# Patient Record
Sex: FEMALE | Race: WHITE | NOT HISPANIC OR LATINO | Employment: FULL TIME | URBAN - METROPOLITAN AREA
[De-identification: names, ages, dates, MRNs, and addresses within clinical notes are randomized per-mention and may not be internally consistent; named-entity substitution may affect disease eponyms.]

---

## 2021-10-23 ENCOUNTER — OFFICE VISIT (OUTPATIENT)
Dept: URGENT CARE | Facility: CLINIC | Age: 61
End: 2021-10-23
Payer: COMMERCIAL

## 2021-10-23 VITALS
OXYGEN SATURATION: 100 % | TEMPERATURE: 98.9 F | SYSTOLIC BLOOD PRESSURE: 128 MMHG | BODY MASS INDEX: 24.4 KG/M2 | WEIGHT: 161 LBS | DIASTOLIC BLOOD PRESSURE: 80 MMHG | RESPIRATION RATE: 20 BRPM | HEART RATE: 68 BPM | HEIGHT: 68 IN

## 2021-10-23 DIAGNOSIS — N89.8 VAGINAL ITCHING: ICD-10-CM

## 2021-10-23 DIAGNOSIS — L50.9 URTICARIA: ICD-10-CM

## 2021-10-23 DIAGNOSIS — R21 RASH AND NONSPECIFIC SKIN ERUPTION: ICD-10-CM

## 2021-10-23 LAB
APPEARANCE UR: CLEAR
BILIRUB UR STRIP-MCNC: NEGATIVE MG/DL
COLOR UR AUTO: YELLOW
GLUCOSE UR STRIP.AUTO-MCNC: NEGATIVE MG/DL
KETONES UR STRIP.AUTO-MCNC: NEGATIVE MG/DL
LEUKOCYTE ESTERASE UR QL STRIP.AUTO: NEGATIVE
NITRITE UR QL STRIP.AUTO: NEGATIVE
PH UR STRIP.AUTO: 8 [PH] (ref 5–8)
PROT UR QL STRIP: NEGATIVE MG/DL
RBC UR QL AUTO: NEGATIVE
SP GR UR STRIP.AUTO: 1.01
UROBILINOGEN UR STRIP-MCNC: 0.2 MG/DL

## 2021-10-23 PROCEDURE — 81002 URINALYSIS NONAUTO W/O SCOPE: CPT | Performed by: NURSE PRACTITIONER

## 2021-10-23 PROCEDURE — 99203 OFFICE O/P NEW LOW 30 MIN: CPT | Mod: 25 | Performed by: NURSE PRACTITIONER

## 2021-10-23 RX ORDER — METHYLPREDNISOLONE SODIUM SUCCINATE 125 MG/2ML
125 INJECTION, POWDER, LYOPHILIZED, FOR SOLUTION INTRAMUSCULAR; INTRAVENOUS ONCE
Status: COMPLETED | OUTPATIENT
Start: 2021-10-23 | End: 2021-10-23

## 2021-10-23 RX ORDER — METHYLPREDNISOLONE 4 MG/1
TABLET ORAL
Qty: 21 TABLET | Refills: 0 | Status: SHIPPED | OUTPATIENT
Start: 2021-10-23 | End: 2021-11-02

## 2021-10-23 RX ADMIN — METHYLPREDNISOLONE SODIUM SUCCINATE 125 MG: 125 INJECTION, POWDER, LYOPHILIZED, FOR SOLUTION INTRAMUSCULAR; INTRAVENOUS at 18:56

## 2021-10-23 ASSESSMENT — ENCOUNTER SYMPTOMS
FEVER: 0
CHILLS: 0
MYALGIAS: 0
DIARRHEA: 0
NAUSEA: 0
HEADACHES: 0
ABDOMINAL PAIN: 0
COUGH: 0
SORE THROAT: 0
VOMITING: 0

## 2021-10-24 NOTE — PATIENT INSTRUCTIONS
Hives  Hives (urticaria) are itchy, red, swollen areas on the skin. Hives can appear on any part of the body. Hives often fade within 24 hours (acute hives). Sometimes, new hives appear after old ones fade and the cycle can continue for several days or weeks (chronic hives). Hives do not spread from person to person (are not contagious).  Hives come from the body's reaction to something a person is allergic to (allergen), something that causes irritation, or various other triggers. When a person is exposed to a trigger, his or her body releases a chemical (histamine) that causes redness, itching, and swelling. Hives can appear right after exposure to a trigger or hours later.  What are the causes?  This condition may be caused by:  · Allergies to foods or ingredients.  · Insect bites or stings.  · Exposure to pollen or pets.  · Contact with latex or chemicals.  · Spending time in sunlight, heat, or cold (exposure).  · Exercise.  · Stress.  · Certain medicines.  You can also get hives from other medical conditions and treatments, such as:  · Viruses, including the common cold.  · Bacterial infections, such as urinary tract infections and strep throat.  · Certain medicines.  · Allergy shots.  · Blood transfusions.  Sometimes, the cause of this condition is not known (idiopathic hives).  What increases the risk?  You are more likely to develop this condition if you:  · Are a woman.  · Have food allergies, especially to citrus fruits, milk, eggs, peanuts, tree nuts, or shellfish.  · Are allergic to:  ? Medicines.  ? Latex.  ? Insects.  ? Animals.  ? Pollen.  What are the signs or symptoms?  Common symptoms of this condition include raised, itchy, red or white bumps or patches on your skin. These areas may:  · Become large and swollen (welts).  · Change in shape and location, quickly and repeatedly.  · Be separate hives or connect over a large area of skin.  · Sting or become painful.  · Turn white when pressed in the  [place] ([place]  In severe cases, your hands, feet, and face may also become swollen. This may occur if hives develop deeper in your skin.  How is this diagnosed?  This condition may be diagnosed by your symptoms, medical history, and physical exam.  · Your skin, urine, or blood may be tested to find out what is causing your hives and to rule out other health issues.  · Your health care provider may also remove a small sample of skin from the affected area and examine it under a microscope (biopsy).  How is this treated?  Treatment for this condition depends on the cause and severity of your symptoms. Your health care provider may recommend using cool, wet cloths (cool compresses) or taking cool showers to relieve itching. Treatment may include:  · Medicines that help:  ? Relieve itching (antihistamines).  ? Reduce swelling (corticosteroids).  ? Treat infection (antibiotics).  · An injectable medicine (omalizumab). Your health care provider may prescribe this if you have chronic idiopathic hives and you continue to have symptoms even after treatment with antihistamines.  Severe cases may require an emergency injection of adrenaline (epinephrine) to prevent a life-threatening allergic reaction (anaphylaxis).  Follow these instructions at home:  Medicines  · Take and apply over-the-counter and prescription medicines only as told by your health care provider.  · If you were prescribed an antibiotic medicine, take it as told by your health care provider. Do not stop using the antibiotic even if you start to feel better.  Skin care  · Apply cool compresses to the affected areas.  · Do not scratch or rub your skin.  General instructions  · Do not take hot showers or baths. This can make itching worse.  · Do not wear tight-fitting clothing.  · Use sunscreen and wear protective clothing when you are outside.  · Avoid any substances that cause your hives. Keep a journal to help track what causes your hives. Write  down:  ? What medicines you take.  ? What you eat and drink.  ? What products you use on your skin.  · Keep all follow-up visits as told by your health care provider. This is important.  Contact a health care provider if:  · Your symptoms are not controlled with medicine.  · Your joints are painful or swollen.  Get help right away if:  · You have a fever.  · You have pain in your abdomen.  · Your tongue or lips are swollen.  · Your eyelids are swollen.  · Your chest or throat feels tight.  · You have trouble breathing or swallowing.  These symptoms may represent a serious problem that is an emergency. Do not wait to see if the symptoms will go away. Get medical help right away. Call your local emergency services (911 in the [place] Do not drive yourself to the hospital.  Summary  · Hives (urticaria) are itchy, red, swollen areas on your skin. Hives come from the body's reaction to something a person is allergic to (allergen), something that causes irritation, or various other triggers.  · Treatment for this condition depends on the cause and severity of your symptoms.  · Avoid any substances that cause your hives. Keep a journal to help track what causes your hives.  · Take and apply over-the-counter and prescription medicines only as told by your health care provider.  · Keep all follow-up visits as told by your health care provider. This is important.  This information is not intended to replace advice given to you by your health care provider. Make sure you discuss any questions you have with your health care provider.  Document Released: 12/18/2006 Document Revised: 07/03/2019 Document Reviewed: 07/03/2019  Elsevier Patient Education © 2020 [employer]  Angioedema  Angioedema is the sudden swelling of tissue in the body. Angioedema can affect any part of the body, but it most often affects the deeper parts of the skin, causing red, itchy patches (hives) to appear over the affected area. It often begins during  the night and is found in the morning.  Depending on the cause, angioedema may happen:  · Only once.  · Several times. It may come back in unpredictable patterns.  · Repeatedly for several years. Over time, it may gradually stop coming back.  Angioedema can be life-threatening if it affects the air passages that you breathe through.  What are the causes?  This condition may be caused by:  · Foods, such as milk, eggs, shellfish, wheat, or nuts.  · Certain medicines, such as ACE inhibitors, antibiotics, nonsteroidal anti-inflammatory drugs, birth control pills, or dyes used in X-rays.  · Insect stings.  · Infections.  Angioedema can be inherited, and episodes can be triggered by:  · Mild injury.  · Dental work.  · Surgery.  · Stress.  · Sudden changes in temperature.  · Exercise.  In some cases, the cause of this condition is not known.  What are the signs or symptoms?  Symptoms of this condition depend on where the swelling happens. Symptoms may include:  · Swollen skin.  · Red, itchy patches of skin (hives).  · Redness in the affected area.  · Pain in the affected area.  · Swollen lips or tongue.  · Wheezing.  · Breathing problems.    If your internal organs are involved, symptoms may also include:  · Nausea.  · Abdominal pain.  · Vomiting.  · Difficulty swallowing.  · Difficulty passing urine.  How is this diagnosed?  This condition may be diagnosed based on:  · An exam of the affected area.  · Your medical history.  · Whether anyone in your family has had this condition before.  · A review of any medicines you have been taking.  · Tests, including:  ? Allergy skin tests to see if the condition was caused by an allergic reaction.  ? Blood tests to see if the condition was caused by a gene.  ? Tests to check for underlying diseases that could cause the condition.  How is this treated?  Treatment for this condition depends on the cause. It may involve any of the following:  · If something triggered the condition,  making changes to keep it from triggering the condition again.  · If the condition affects your breathing, having tubes placed in your airway to keep it open.  · Taking medicines to treat symptoms or prevent future episodes. These may include:  ? Antihistamines.  ? Epinephrine injections.  ? Steroids.  If your condition is severe, you may need to be treated at the hospital. Angioedema usually gets better in 24-48 hours.  Follow these instructions at home:  · Take over-the-counter and prescription medicines only as told by your health care provider.  · If you were given medicines for emergency allergy treatment, always carry them with you.  · Wear a medical bracelet as told by your health care provider.  · If something triggers your condition, avoid the trigger, if possible.  · If your condition is inherited and you are thinking about having children, talk to your health care provider. It is important to discuss the risks of passing on the condition to your children.  Contact a health care provider if:  · You have repeated episodes of angioedema.  · Episodes of angioedema start to happen more often than they used to, even after you take steps to prevent them.  · You have episodes of angioedema that are more severe than they have been before, even after you take steps to prevent them.  · You are thinking about having children.  Get help right away if:  · You have severe swelling of your mouth, tongue, or lips.  · You have trouble breathing.  · You have trouble swallowing.  · You faint.  This information is not intended to replace advice given to you by your health care provider. Make sure you discuss any questions you have with your health care provider.  Document Released: 02/26/2003 Document Revised: 11/30/2018 Document Reviewed: 06/27/2017  Elsevier Patient Education © 2020 [employer]

## 2021-10-24 NOTE — PROGRESS NOTES
Subjective:     Amelia Thakkar is a 61 y.o. female who presents for Rash (Rash all over body, UTI x 2 weeks)      Rash  Pertinent negatives include no congestion, cough, diarrhea, fever, sore throat or vomiting.     Pt presents for evaluation of a new problem.  Amelia is a pleasant 61-year-old female who presents to urgent care today for complaints of a rash over her entire body that started 3 days ago.  Her rash is progressively worsening.  She notes that her skin has been sensitive since 5 months ago.  She notes severe dryness and itching of her scalp.  She has been using dandruff shampoo and hydrating lotion.  3 days ago she developed a itching rash over her shoulders that has progressively spread all the way down to her legs and up her face.  She has been using over-the-counter Benadryl and hydrocortisone cream.  She notes that this is kept this symptoms at bay, but has not improved her symptoms.  She denies any sore throat, difficulty swallowing, difficulty breathing, nausea/vomiting or diarrhea.  She also complains of vaginal itching and is concerned she may have a urinary tract infection.  She denies any new soap, lotion, laundry detergent, dryer sheets, food or medication.    Review of Systems   Constitutional: Negative for chills, fever and malaise/fatigue.   HENT: Negative for congestion and sore throat.    Respiratory: Negative for cough.    Gastrointestinal: Negative for abdominal pain, diarrhea, nausea and vomiting.   Musculoskeletal: Negative for myalgias.   Skin: Positive for itching and rash.   Neurological: Negative for headaches.       PMH: History reviewed. No pertinent past medical history.  ALLERGIES: Not on File  SURGHX: History reviewed. No pertinent surgical history.  SOCHX:   Social History     Socioeconomic History   • Marital status: Single     Spouse name: Not on file   • Number of children: Not on file   • Years of education: Not on file   • Highest education level: Not on file  "  Occupational History   • Not on file   Tobacco Use   • Smoking status: Not on file   Substance and Sexual Activity   • Alcohol use: Not on file   • Drug use: Not on file   • Sexual activity: Not on file   Other Topics Concern   • Not on file   Social History Narrative   • Not on file     Social Determinants of Health     Financial Resource Strain:    • Difficulty of Paying Living Expenses:    Food Insecurity:    • Worried About Running Out of Food in the Last Year:    • Ran Out of Food in the Last Year:    Transportation Needs:    • Lack of Transportation (Medical):    • Lack of Transportation (Non-Medical):    Physical Activity:    • Days of Exercise per Week:    • Minutes of Exercise per Session:    Stress:    • Feeling of Stress :    Social Connections:    • Frequency of Communication with Friends and Family:    • Frequency of Social Gatherings with Friends and Family:    • Attends [employer] Services:    • Active Member of Clubs or Organizations:    • Attends Club or Organization Meetings:    • Marital Status:    Intimate Partner Violence:    • Fear of Current or Ex-Partner:    • Emotionally Abused:    • Physically Abused:    • Sexually Abused:      FH: History reviewed. No pertinent family history.      Objective:   /80 (BP Location: Left arm, Patient Position: Sitting, BP Cuff Size: Adult long)   Pulse 68   Temp 37.2 °C (98.9 °F) (Temporal)   Resp 20   Ht 1.727 m (5' 8\")   Wt 73 kg (161 lb)   SpO2 100%   BMI 24.48 kg/m²     Physical Exam  Vitals and nursing note reviewed.   Constitutional:       General: She is not in acute distress.     Appearance: Normal appearance. She is not ill-appearing.   HENT:      Head: Normocephalic and atraumatic.      Right Ear: External ear normal.      Left Ear: External ear normal.      Nose: No congestion or rhinorrhea.      Mouth/Throat:      Mouth: Mucous membranes are moist.   Eyes:      Extraocular Movements: Extraocular movements intact.      Pupils: Pupils " are equal, round, and reactive to light.   Cardiovascular:      Rate and Rhythm: Normal rate and regular rhythm.      Pulses: Normal pulses.      Heart sounds: Normal heart sounds.   Pulmonary:      Effort: Pulmonary effort is normal.      Breath sounds: Normal breath sounds.   Abdominal:      General: Abdomen is flat. Bowel sounds are normal.      Palpations: Abdomen is soft.      Tenderness: There is no abdominal tenderness. There is no right CVA tenderness or left CVA tenderness.   Musculoskeletal:         General: Normal range of motion.      Cervical back: Normal range of motion and neck supple.   Skin:     General: Skin is warm and dry.      Capillary Refill: Capillary refill takes less than 2 seconds.      Findings: Erythema and rash present. Rash is macular, papular and urticarial.      Comments: positive for large urticarial wheals over neck, back, torso, arms, hands, legs and face.   Neurological:      General: No focal deficit present.      Mental Status: She is alert and oriented to person, place, and time. Mental status is at baseline.   Psychiatric:         Mood and Affect: Mood normal.         Behavior: Behavior normal.         Thought Content: Thought content normal.         Judgment: Judgment normal.         Assessment/Plan:   Assessment    1. Vaginal itching  POCT Urinalysis   2. Rash and nonspecific skin eruption  REFERRAL TO ALLERGY    methylPREDNISolone sod succ (SOLU-MEDROL) 125 MG injection 125 mg    methylPREDNISolone (MEDROL DOSEPAK) 4 MG Tablet Therapy Pack   3. Urticaria  REFERRAL TO ALLERGY    methylPREDNISolone sod succ (SOLU-MEDROL) 125 MG injection 125 mg    methylPREDNISolone (MEDROL DOSEPAK) 4 MG Tablet Therapy Pack      Encouraged her to use over-the-counter Benadryl, Zyrtec in addition to steroid.  Steroid injection given in clinic today.  She tolerated this well.  Medrol Dosepak was also sent to the pharmacy for her to start in 2 days if symptoms do not improve.  She verbalized  understanding.  Side effects of steroids discussed.  We discussed differential diagnoses for itching.  I also recommended she refrain from any laundry detergent scent.  She was referred to follow up with allergy for further treatment and evaluation.  AVS handout given and reviewed with patient. Pt educated on red flags and when to seek treatment back in ER or UC.

## 2021-11-02 ENCOUNTER — OFFICE VISIT (OUTPATIENT)
Dept: MEDICAL GROUP | Facility: PHYSICIAN GROUP | Age: 61
End: 2021-11-02
Payer: COMMERCIAL

## 2021-11-02 VITALS
WEIGHT: 158 LBS | HEART RATE: 81 BPM | OXYGEN SATURATION: 98 % | DIASTOLIC BLOOD PRESSURE: 76 MMHG | RESPIRATION RATE: 12 BRPM | TEMPERATURE: 98.8 F | SYSTOLIC BLOOD PRESSURE: 108 MMHG | HEIGHT: 68 IN | BODY MASS INDEX: 23.95 KG/M2

## 2021-11-02 DIAGNOSIS — H01.00B BLEPHARITIS OF UPPER AND LOWER EYELIDS OF BOTH EYES, UNSPECIFIED TYPE: ICD-10-CM

## 2021-11-02 DIAGNOSIS — Z83.42 FAMILY HISTORY OF HIGH CHOLESTEROL: ICD-10-CM

## 2021-11-02 DIAGNOSIS — H01.00A BLEPHARITIS OF UPPER AND LOWER EYELIDS OF BOTH EYES, UNSPECIFIED TYPE: ICD-10-CM

## 2021-11-02 DIAGNOSIS — Z11.59 NEED FOR HEPATITIS C SCREENING TEST: ICD-10-CM

## 2021-11-02 DIAGNOSIS — L50.9 URTICARIA: ICD-10-CM

## 2021-11-02 DIAGNOSIS — Z12.31 ENCOUNTER FOR SCREENING MAMMOGRAM FOR BREAST CANCER: ICD-10-CM

## 2021-11-02 DIAGNOSIS — Z98.890 S/P CORRECTION OF DEVIATED NASAL SEPTUM: ICD-10-CM

## 2021-11-02 DIAGNOSIS — Z83.3 FAMILY HISTORY OF DIABETES MELLITUS (DM): ICD-10-CM

## 2021-11-02 DIAGNOSIS — R21 RASH AND NONSPECIFIC SKIN ERUPTION: ICD-10-CM

## 2021-11-02 DIAGNOSIS — Z98.890 HISTORY OF LUMPECTOMY OF RIGHT BREAST: ICD-10-CM

## 2021-11-02 DIAGNOSIS — Z76.89 ENCOUNTER TO ESTABLISH CARE: ICD-10-CM

## 2021-11-02 DIAGNOSIS — J45.20 MILD INTERMITTENT ASTHMA WITHOUT COMPLICATION: ICD-10-CM

## 2021-11-02 PROBLEM — J34.2 DEVIATED SEPTUM: Status: ACTIVE | Noted: 2021-06-16

## 2021-11-02 PROCEDURE — 99214 OFFICE O/P EST MOD 30 MIN: CPT | Performed by: NURSE PRACTITIONER

## 2021-11-02 RX ORDER — ERYTHROMYCIN 5 MG/G
1 OINTMENT OPHTHALMIC 4 TIMES DAILY
Qty: 3.5 G | Refills: 1 | Status: SHIPPED | OUTPATIENT
Start: 2021-11-02 | End: 2021-11-02

## 2021-11-02 RX ORDER — HYDROCODONE BITARTRATE AND ACETAMINOPHEN 5; 325 MG/1; MG/1
TABLET ORAL
COMMUNITY
Start: 2021-08-18 | End: 2021-11-02

## 2021-11-02 RX ORDER — SODIUM CHLORIDE 0.65 %
AEROSOL, SPRAY (ML) NASAL
COMMUNITY
Start: 2021-08-18 | End: 2021-11-30

## 2021-11-02 RX ORDER — ERYTHROMYCIN 5 MG/G
1 OINTMENT OPHTHALMIC 2 TIMES DAILY
Qty: 3.5 G | Refills: 0 | Status: SHIPPED | OUTPATIENT
Start: 2021-11-02 | End: 2021-11-30

## 2021-11-02 RX ORDER — OXYMETAZOLINE HCL 0.05 G/100ML
SPRAY NASAL
COMMUNITY
Start: 2021-08-18 | End: 2021-11-30

## 2021-11-02 RX ORDER — OXYMETAZOLINE HYDROCHLORIDE 0.05 G/100ML
SPRAY NASAL
COMMUNITY
Start: 2021-08-18 | End: 2021-11-30

## 2021-11-02 RX ORDER — CEPHALEXIN 500 MG/1
CAPSULE ORAL
COMMUNITY
Start: 2021-08-18 | End: 2021-11-02

## 2021-11-02 RX ORDER — AZELASTINE 1 MG/ML
1 SPRAY, METERED NASAL
COMMUNITY
Start: 2021-09-20 | End: 2021-11-30

## 2021-11-02 RX ORDER — ALBUTEROL SULFATE 90 UG/1
AEROSOL, METERED RESPIRATORY (INHALATION)
COMMUNITY
Start: 2021-08-24 | End: 2022-09-06 | Stop reason: SDUPTHER

## 2021-11-02 RX ORDER — ECHINACEA PURPUREA EXTRACT 125 MG
1 TABLET ORAL
COMMUNITY
Start: 2021-08-18 | End: 2021-11-30

## 2021-11-02 RX ORDER — TRIAMCINOLONE ACETONIDE 1 MG/G
1 CREAM TOPICAL 2 TIMES DAILY
Qty: 28.4 G | Refills: 2 | Status: SHIPPED | OUTPATIENT
Start: 2021-11-02 | End: 2021-11-30

## 2021-11-02 ASSESSMENT — PATIENT HEALTH QUESTIONNAIRE - PHQ9: CLINICAL INTERPRETATION OF PHQ2 SCORE: 0

## 2021-11-02 NOTE — ASSESSMENT & PLAN NOTE
This is a historical condition, stable.  She has had nasal septoplasty and bilateral IT cauterizatin/reduction done 8/2021.  She denies any concerns after surgery.

## 2021-11-02 NOTE — ASSESSMENT & PLAN NOTE
This is a chronic condition, stable.  She reports chemical exposure at work caused asthma.  She reports no recent exacerbation.  She does have Wixela, which she uses one puff daily; and albuterol inhaler PRN - reports rarely uses.  She denies any respiratory symptoms today.   > Continue Wixela 100-50 mcg one puff daily.  Continue albuterol inhaler as needed.  She does have a scheduled appointment with allergist later this week.

## 2021-11-02 NOTE — PROGRESS NOTES
Subjective  Chief Complaint  Establish care to manage her chronic conditions    History of Present Illness  Amelia Thakkar is a 61 y.o. female. This patient is here today to establish care.    Patient Active Problem List    Diagnosis Date Noted   • S/P correction of deviated nasal septum 06/16/2021   • Malignant neoplasm of skin 11/07/2012   • Asthma 05/22/2012     Past Medical History    Allergies: Other drug, Phenazopyridine, Phenazopyridine hcl, and Azo-gantrisin  History reviewed. No pertinent past medical history.  Past Surgical History:   Procedure Laterality Date   • NASAL SEPTAL RECONSTRUCTION  08/2021   • LUMPECTOMY Right 1988     Current Outpatient Medications Ordered in Epic   Medication Sig Dispense Refill   • albuterol 108 (90 Base) MCG/ACT Aero Soln inhalation aerosol      • azelastine (ASTELIN) 137 MCG/SPRAY nasal spray Administer 1 Spray into affected nostril(S).     • WIXELA INHUB 100-50 MCG/DOSE AEROSOL POWDER, BREATH ACTIVATED      • Oxymetazoline HCl (NASAL SPRAY) 0.05 % Solution USE 2 SPRAYS IN EACH NOSTRIL 2 TIMES DAILY. DO NOT USE FOR MORE THAN 3 CONSECUTIVE DAYS.     • GNP NASAL SPRAY EXTRA MOIST 0.05 % Solution      • sodium chloride (OCEAN) 0.65 % Solution Administer 1 Spray into affected nostril(S).     • DEEP SEA NASAL SPRAY 0.65 % Solution      • triamcinolone acetonide (KENALOG) 0.1 % Cream Apply 1 g topically 2 times a day. 28.4 g 2   • erythromycin 5 MG/GM Ointment Apply 1 Application to both eyes 2 times a day. For 7 days 3.5 g 0     No current Epic-ordered facility-administered medications on file.     Family History:    Family History   Problem Relation Age of Onset   • Diabetes Mother    • Diabetes Father    • Hypertension Father    • Hyperlipidemia Father    • Cancer Paternal Aunt         breast   • Hypertension Paternal Aunt    • Hypertension Paternal Uncle    • Cancer Maternal Grandfather         skin   • Cancer Other         uterine - cousin paternal   • Heart Disease Neg Hx   "  • Stroke Neg Hx       Personal/Social History:    Social History     Tobacco Use   • Smoking status: Never Smoker   • Smokeless tobacco: Never Used   Vaping Use   • Vaping Use: Never used   Substance Use Topics   • Alcohol use: Yes     Comment: occ   • Drug use: Not Currently     Social History     Social History Narrative   • Not on file      Current Outpatient Medications   Medication Sig Dispense Refill   • albuterol 108 (90 Base) MCG/ACT Aero Soln inhalation aerosol      • azelastine (ASTELIN) 137 MCG/SPRAY nasal spray Administer 1 Spray into affected nostril(S).     • WIXELA INHUB 100-50 MCG/DOSE AEROSOL POWDER, BREATH ACTIVATED      • Oxymetazoline HCl (NASAL SPRAY) 0.05 % Solution USE 2 SPRAYS IN EACH NOSTRIL 2 TIMES DAILY. DO NOT USE FOR MORE THAN 3 CONSECUTIVE DAYS.     • GNP NASAL SPRAY EXTRA MOIST 0.05 % Solution      • sodium chloride (OCEAN) 0.65 % Solution Administer 1 Spray into affected nostril(S).     • DEEP SEA NASAL SPRAY 0.65 % Solution      • triamcinolone acetonide (KENALOG) 0.1 % Cream Apply 1 g topically 2 times a day. 28.4 g 2   • erythromycin 5 MG/GM Ointment Apply 1 Application to both eyes 2 times a day. For 7 days 3.5 g 0     No current facility-administered medications for this visit.     Review of Systems  General: Negative for fever/chills.   Eyes:  Negative for vision changes.  ENT:  Negative for congestion.   Respiratory:  Negative for cough and dyspnea.    Cardiovascular:  Negative for chest pain and palpitations.  Gastrointestinal:  Negative for abdominal pain.   Genitourinary:  Negative for dysuria.   Musculoskeletal:  Negative for myalgias.   Skin:  See below.  Neurological:  Negative for numbness/tingling.   Heme/Lymph:  Does not bruise/bleed easily.    Objective  Physical Exam  /76 (BP Location: Right arm, Patient Position: Sitting, BP Cuff Size: Adult)   Pulse 81   Temp 37.1 °C (98.8 °F) (Temporal)   Resp 12   Ht 1.727 m (5' 8\")   Wt 71.7 kg (158 lb)   SpO2 98%  " Body mass index is 24.02 kg/m².  General:  Alert and oriented.  Well appearing.  NAD.  Head:  Normocephalic.   Eyes:  Eyes conjunctiva clear lids without ptosis; bilateral eyelids with pruritis, redness with crusting.  ENT: Ears normal shape and contour.  Pulmonary:  Normal effort.  Clear to ausculation without rales, ronchi, or wheezing.  Cardiovascular:  Regular rate and rhythm without murmur, rubs or gallop.  Radial pulses are intact and equal bilaterally.  Gastrointestinal: Abdomen soft, nontender, nondistended. Normal bowel sounds. Liver and spleen are not palpable.  Musculoskeletal:  No extremity cyanosis, clubbing, or edema.  Skin:  Warm and dry.  Erythematous, pruritic.  Macular, papular, urticarial rash throughout back, neck, bilateral arms, and legs.    Neurologic: Grossly intact.  Sensation intact.   Psych: Normal mood and affect. Alert and oriented x3. Judgment and insight is normal.    Assessment/Plan   61 y.o. female presenting with the following.     1. Encounter to establish care     2. Mild intermittent asthma without complication     3. S/P correction of deviated nasal septum     4. Rash and nonspecific skin eruption  triamcinolone acetonide (KENALOG) 0.1 % Cream   5. Urticaria     6. Blepharitis of upper and lower eyelids of both eyes, unspecified type  erythromycin 5 MG/GM Ointment    DISCONTINUED: erythromycin 5 MG/GM Ointment   7. Encounter for screening mammogram for breast cancer  MA-SCREENING MAMMO BILAT W/TOMOSYNTHESIS W/CAD   8. History of lumpectomy of right breast  MA-SCREENING MAMMO BILAT W/TOMOSYNTHESIS W/CAD   9. Need for hepatitis C screening test  HCV Scrn ( [id number] [employer])   10. Family history of diabetes mellitus (DM)  Comp Metabolic Panel    HEMOGLOBIN A1C    CBC WITH DIFFERENTIAL   11. Family history of high cholesterol  Comp Metabolic Panel    Lipid Profile     Asthma  This is a chronic condition, stable.  She reports chemical exposure at work caused asthma.  She reports  no recent exacerbation.  She does have Wixela, which she uses one puff daily; and albuterol inhaler PRN - reports rarely uses.  She denies any respiratory symptoms today.   > Continue Wixela 100-50 mcg one puff daily.  Continue albuterol inhaler as needed.  She does have a scheduled appointment with allergist later this week.    S/P correction of deviated nasal septum  This is a historical condition, stable.  She has had nasal septoplasty and bilateral IT cauterizatin/reduction done 8/2021.  She denies any concerns after surgery.       Rash and nonspecific skin eruption  Urticaria  Blepharitis of upper and lower eyelids of both eyes, unspecified type  These are acute conditions.  Last seen in urgent care 10/23/2021.  At urgent care, was given IM methylprednisolone injection and medrol dose pack.  She reports completion of medrol dose pack with minimal relief from pruritic rash throughout her body.  She does have an upcoming appointment with allergist this week.  She denies any new medications, soaps, lotions, laundry detergent, or food.  She denies any respiratory symptoms, including sore throat, difficulty swallowing, difficulty breathing, shortness of breath.  She reports mild improvement of rash.   > For blepharitis of bilateral eyelids, erythromycin application to upper and lower eyelids BID for 7 days.  For rash/urticaria, kenalog cream application BID to affected areas.  Advised to continue avoidance of new medications, soaps, lotions, detergent or food.  Discussed will hold off on vaccines at this point until rash improvement.  Advised to keep appointment with allergist.     Return in about 4 weeks (around 11/30/2021), or if symptoms worsen or fail to improve, for hives/labs.    Health Maintenance: Completed > return back to clinic in 4 weeks for follow up and vaccinations.    I have placed the below orders and discussed them with an approved delegating provider.  The MA is performing the below orders under  the direction of Dr. Leone.    Please note that this dictation was created using voice recognition software. I have worked with consultants from the vendor as well as technical experts from [facility] to optimize the interface. I have made every reasonable attempt to correct obvious errors, but I expect that there are errors of grammar and possibly content that I did not discover before finalizing the note.    JOSE Chairez  [facility]

## 2021-11-05 ENCOUNTER — HOSPITAL ENCOUNTER (OUTPATIENT)
Dept: LAB | Facility: MEDICAL CENTER | Age: 61
End: 2021-11-05
Attending: NURSE PRACTITIONER
Payer: COMMERCIAL

## 2021-11-05 LAB
BASOPHILS # BLD AUTO: 0.2 % (ref 0–1.8)
BASOPHILS # BLD: 0.02 K/UL (ref 0–0.12)
CRP SERPL HS-MCNC: <0.3 MG/DL (ref 0–0.75)
EOSINOPHIL # BLD AUTO: 0.01 K/UL (ref 0–0.51)
EOSINOPHIL NFR BLD: 0.1 % (ref 0–6.9)
ERYTHROCYTE [DISTWIDTH] IN BLOOD BY AUTOMATED COUNT: 45.5 FL (ref 35.9–50)
HCT VFR BLD AUTO: 44.6 % (ref 37–47)
HGB BLD-MCNC: 15 G/DL (ref 12–16)
IMM GRANULOCYTES # BLD AUTO: 0.07 K/UL (ref 0–0.11)
IMM GRANULOCYTES NFR BLD AUTO: 0.7 % (ref 0–0.9)
LYMPHOCYTES # BLD AUTO: 1.13 K/UL (ref 1–4.8)
LYMPHOCYTES NFR BLD: 10.9 % (ref 22–41)
MCH RBC QN AUTO: 32.5 PG (ref 27–33)
MCHC RBC AUTO-ENTMCNC: 33.6 G/DL (ref 33.6–35)
MCV RBC AUTO: 96.7 FL (ref 81.4–97.8)
MONOCYTES # BLD AUTO: 0.45 K/UL (ref 0–0.85)
MONOCYTES NFR BLD AUTO: 4.3 % (ref 0–13.4)
NEUTROPHILS # BLD AUTO: 8.73 K/UL (ref 2–7.15)
NEUTROPHILS NFR BLD: 83.8 % (ref 44–72)
NRBC # BLD AUTO: 0 K/UL
NRBC BLD-RTO: 0 /100 WBC
PLATELET # BLD AUTO: 312 K/UL (ref 164–446)
PMV BLD AUTO: 11.2 FL (ref 9–12.9)
RBC # BLD AUTO: 4.61 M/UL (ref 4.2–5.4)
WBC # BLD AUTO: 10.4 K/UL (ref 4.8–10.8)

## 2021-11-05 PROCEDURE — 83520 IMMUNOASSAY QUANT NOS NONAB: CPT

## 2021-11-05 PROCEDURE — 36415 COLL VENOUS BLD VENIPUNCTURE: CPT

## 2021-11-05 PROCEDURE — 82785 ASSAY OF IGE: CPT

## 2021-11-05 PROCEDURE — 86003 ALLG SPEC IGE CRUDE XTRC EA: CPT | Mod: 91

## 2021-11-05 PROCEDURE — 85025 COMPLETE CBC W/AUTO DIFF WBC: CPT

## 2021-11-05 PROCEDURE — 86140 C-REACTIVE PROTEIN: CPT

## 2021-11-07 LAB — TRYPTASE SERPL-MCNC: 4.5 UG/L

## 2021-11-09 LAB
A ALTERNATA IGE QN: <0.1 KU/L
A FUMIGATUS IGE QN: <0.1 KU/L
ALMOND IGE QN: <0.1 KU/L
AVOCADO IGE QN: <0.1 KU/L
BANANA IGE QN: 0.16 KU/L
BERMUDA GRASS IGE QN: <0.1 KU/L
BOXELDER IGE QN: <0.1 KU/L
C SPHAEROSPERMUM IGE QN: <0.1 KU/L
CAT DANDER IGE QN: <0.1 KU/L
CELERY IGE QN: <0.1 KU/L
CHESTNUT IGE QN: <0.1 KU/L
CMN PIGWEED IGE QN: <0.1 KU/L
COCONUT IGE QN: <0.1 KU/L
COMMON RAGWEED IGE QN: <0.1 KU/L
COTTONWOOD IGE QN: <0.1 KU/L
COW MILK IGE QN: <0.1 KU/L
D FARINAE IGE QN: 0.26 KU/L
D PTERONYSS IGE QN: 0.19 KU/L
DEPRECATED MISC ALLERGEN IGE RAST QL: NORMAL
DOG DANDER IGE QN: <0.1 KU/L
EGG WHITE IGE QN: <0.1 KU/L
GRAPE IGE QN: <0.1 KU/L
IGE SERPL-ACNC: 164 KU/L
KIWIFRUIT IGE QN: <0.1 KU/L
M RACEMOSUS IGE QN: <0.1 KU/L
MOUSE EPITH IGE QN: <0.1 KU/L
MT JUNIPER IGE QN: <0.1 KU/L
MUGWORT IGE QN: <0.1 KU/L
OAT IGE QN: <0.1 KU/L
OLIVE POLN IGE QN: <0.1 KU/L
P NOTATUM IGE QN: <0.1 KU/L
PAPAYA IGE QN: <0.1 KU/L
PEANUT IGE QN: <0.1 KU/L
PECAN/HICK NUT IGE QN: <0.1 KU/L
POTATO IGE QN: <0.1 KU/L
ROACH IGE QN: <0.1 KU/L
SALTWORT IGE QN: <0.1 KU/L
SESAME SEED IGE QN: <0.1 KU/L
SOYBEAN IGE QN: <0.1 KU/L
TIMOTHY IGE QN: <0.1 KU/L
TOMATO IGE QN: <0.1 KU/L
WATERMELON IGE QN: <0.1 KU/L
WHEAT IGE QN: <0.1 KU/L
WHITE ELM IGE QN: <0.1 KU/L
WHITE MULBERRY IGE QN: <0.1 KU/L
WHITE OAK IGE QN: <0.1 KU/L
WORMWOOD IGE QN: <0.1 KU/L

## 2021-11-15 ENCOUNTER — HOSPITAL ENCOUNTER (OUTPATIENT)
Dept: LAB | Facility: MEDICAL CENTER | Age: 61
End: 2021-11-15
Attending: PHYSICIAN ASSISTANT
Payer: COMMERCIAL

## 2021-11-15 LAB
ALBUMIN SERPL BCP-MCNC: 4.3 G/DL (ref 3.2–4.9)
ALBUMIN/GLOB SERPL: 1.7 G/DL
ALP SERPL-CCNC: 76 U/L (ref 30–99)
ALT SERPL-CCNC: 12 U/L (ref 2–50)
ANION GAP SERPL CALC-SCNC: 10 MMOL/L (ref 7–16)
AST SERPL-CCNC: 13 U/L (ref 12–45)
BILIRUB SERPL-MCNC: 0.4 MG/DL (ref 0.1–1.5)
BUN SERPL-MCNC: 19 MG/DL (ref 8–22)
CALCIUM SERPL-MCNC: 9.7 MG/DL (ref 8.5–10.5)
CHLORIDE SERPL-SCNC: 104 MMOL/L (ref 96–112)
CO2 SERPL-SCNC: 25 MMOL/L (ref 20–33)
CREAT SERPL-MCNC: 0.93 MG/DL (ref 0.5–1.4)
GLOBULIN SER CALC-MCNC: 2.6 G/DL (ref 1.9–3.5)
GLUCOSE SERPL-MCNC: 93 MG/DL (ref 65–99)
POTASSIUM SERPL-SCNC: 4.1 MMOL/L (ref 3.6–5.5)
PROT SERPL-MCNC: 6.9 G/DL (ref 6–8.2)
SODIUM SERPL-SCNC: 139 MMOL/L (ref 135–145)

## 2021-11-15 PROCEDURE — 36415 COLL VENOUS BLD VENIPUNCTURE: CPT

## 2021-11-15 PROCEDURE — 86480 TB TEST CELL IMMUN MEASURE: CPT

## 2021-11-15 PROCEDURE — 80053 COMPREHEN METABOLIC PANEL: CPT

## 2021-11-16 LAB
GAMMA INTERFERON BACKGROUND BLD IA-ACNC: 0.02 IU/ML
M TB IFN-G BLD-IMP: NEGATIVE
M TB IFN-G CD4+ BCKGRND COR BLD-ACNC: -0.01 IU/ML
MITOGEN IGNF BCKGRD COR BLD-ACNC: 5.71 IU/ML
QFT TB2 - NIL TBQ2: -0.01 IU/ML

## 2021-11-30 ENCOUNTER — OFFICE VISIT (OUTPATIENT)
Dept: MEDICAL GROUP | Facility: PHYSICIAN GROUP | Age: 61
End: 2021-11-30
Payer: COMMERCIAL

## 2021-11-30 VITALS
TEMPERATURE: 98.7 F | RESPIRATION RATE: 12 BRPM | WEIGHT: 166.8 LBS | HEIGHT: 68 IN | OXYGEN SATURATION: 97 % | BODY MASS INDEX: 25.28 KG/M2 | DIASTOLIC BLOOD PRESSURE: 86 MMHG | HEART RATE: 75 BPM | SYSTOLIC BLOOD PRESSURE: 112 MMHG

## 2021-11-30 DIAGNOSIS — Z12.11 COLON CANCER SCREENING: ICD-10-CM

## 2021-11-30 DIAGNOSIS — L44.0 PRP (PITYRIASIS RUBRA PILARIS): ICD-10-CM

## 2021-11-30 PROCEDURE — 99213 OFFICE O/P EST LOW 20 MIN: CPT | Performed by: NURSE PRACTITIONER

## 2021-11-30 RX ORDER — PREDNISONE 20 MG/1
TABLET ORAL
COMMUNITY
Start: 2021-11-04 | End: 2021-11-30

## 2021-11-30 RX ORDER — DOXYCYCLINE HYCLATE 100 MG
TABLET ORAL
COMMUNITY
Start: 2021-11-15 | End: 2021-11-30

## 2021-11-30 ASSESSMENT — FIBROSIS 4 INDEX: FIB4 SCORE: 0.71

## 2021-11-30 NOTE — PROGRESS NOTES
"Subjective  Chief Complaint  Chief Complaint   Patient presents with   • Follow-Up     PRP     History of Present Illness  Amelia presents today with the following.  Problem   Prp (Pityriasis Rubra Pilaris)     Past Medical History    Allergies: Other drug, Phenazopyridine, Phenazopyridine hcl, and Azo-gantrisin  History reviewed. No pertinent past medical history.  Current Outpatient Medications Ordered in Epic   Medication Sig Dispense Refill   • albuterol 108 (90 Base) MCG/ACT Aero Soln inhalation aerosol      • WIXELA INHUB 100-50 MCG/DOSE AEROSOL POWDER, BREATH ACTIVATED        No current Epic-ordered facility-administered medications on file.     Review of Systems  See below.     Objective  Physical Exam  /86 (BP Location: Left arm, Patient Position: Sitting, BP Cuff Size: Adult)   Pulse 75   Temp 37.1 °C (98.7 °F) (Temporal)   Resp 12   Ht 1.727 m (5' 8\")   Wt 75.7 kg (166 lb 12.8 oz)   SpO2 97%  Body mass index is 25.36 kg/m².  General: Alert, no distress, well-groomed.  Skin: Macular, papular, urticarial rash throughout back, neck, bilateral arms, and legs - improved since last visit.  Thickening/yellowing of bilateral palms and soles of feet.    Neuro: Grossly non-focal.   Psych: Alert and oriented x3, normal affect and mood.    Assessment/Plan  61 y.o. female with the following issues.    1. PRP (pityriasis rubra pilaris)     2. Colon cancer screening  Referral to  for Colonoscopy      PRP (pityriasis rubra pilaris)  This is an acute condition, diagnosed by allergist/dermatologist.  She is currently undergoing Ixekizumab (Taltz) injections once every 2 weeks for next 10 weeks (she reports second injection on Thursday, which she will do at home) and then will move to monthly.  She reports has follow up appointment with dermatology in one month.  She reports that the pruritis is more tolerable at this time.  She reports that her symptoms are better than it was in two weeks.  She reports use of " Aquaphor frequently to help with dryness of skin.  She is requesting for updates on preventative screenings to rule out potential malignancy that is causing this particular skin condition, including colonoscopy.  > Referral to GI for colonoscopy placed.  She is due for pap - will schedule in 2 weeks per patient request.  She will schedule mammogram.  Continue treatment plan per and close follow up with dermatology.    Return in about 2 weeks (around 12/14/2021), or if symptoms worsen or fail to improve, for well woman with pap.    I have placed the below orders and discussed them with an approved delegating provider.  The MA is performing the below orders under the direction of Dr. James.    Please note that this dictation was created using voice recognition software. I have worked with consultants from the vendor as well as technical experts from [facility] to optimize the interface. I have made every reasonable attempt to correct obvious errors, but I expect that there are errors of grammar and possibly content that I did not discover before finalizing the note.    JOSE Chairez  [facility]

## 2021-11-30 NOTE — ASSESSMENT & PLAN NOTE
This is an acute condition, diagnosed by allergist/dermatologist.  She is currently undergoing Ixekizumab (Taltz) injections once every 2 weeks for next 10 weeks (she reports second injection on Thursday, which she will do at home) and then will move to monthly.  She reports has follow up appointment with dermatology in one month.  She reports that the pruritis is more tolerable at this time.  She reports that her symptoms are better than it was in two weeks.  She reports use of Aquaphor frequently to help with dryness of skin.  She is requesting for updates on preventative screenings to rule out potential malignancy that is causing this particular skin condition, including colonoscopy.  > Referral to GI for colonoscopy placed.  She is due for pap - will schedule in 2 weeks per patient request.  She will schedule mammogram.  Continue treatment plan per and close follow up with dermatology.

## 2021-12-14 ENCOUNTER — APPOINTMENT (OUTPATIENT)
Dept: MEDICAL GROUP | Facility: PHYSICIAN GROUP | Age: 61
End: 2021-12-14
Payer: COMMERCIAL

## 2021-12-15 ENCOUNTER — HOSPITAL ENCOUNTER (OUTPATIENT)
Facility: MEDICAL CENTER | Age: 61
End: 2021-12-15
Attending: NURSE PRACTITIONER
Payer: COMMERCIAL

## 2021-12-15 ENCOUNTER — OFFICE VISIT (OUTPATIENT)
Dept: MEDICAL GROUP | Facility: PHYSICIAN GROUP | Age: 61
End: 2021-12-15
Payer: COMMERCIAL

## 2021-12-15 VITALS
HEART RATE: 82 BPM | OXYGEN SATURATION: 98 % | TEMPERATURE: 98.2 F | SYSTOLIC BLOOD PRESSURE: 122 MMHG | RESPIRATION RATE: 12 BRPM | WEIGHT: 170 LBS | BODY MASS INDEX: 25.76 KG/M2 | DIASTOLIC BLOOD PRESSURE: 84 MMHG | HEIGHT: 68 IN

## 2021-12-15 DIAGNOSIS — Z11.51 SCREENING FOR HUMAN PAPILLOMAVIRUS: ICD-10-CM

## 2021-12-15 DIAGNOSIS — Z12.4 SCREENING FOR CERVICAL CANCER: ICD-10-CM

## 2021-12-15 DIAGNOSIS — Z01.419 ENCOUNTER FOR GYNECOLOGICAL EXAMINATION: ICD-10-CM

## 2021-12-15 PROCEDURE — 87624 HPV HI-RISK TYP POOLED RSLT: CPT

## 2021-12-15 PROCEDURE — 88175 CYTOPATH C/V AUTO FLUID REDO: CPT

## 2021-12-15 PROCEDURE — 99396 PREV VISIT EST AGE 40-64: CPT | Performed by: NURSE PRACTITIONER

## 2021-12-15 SDOH — ECONOMIC STABILITY: TRANSPORTATION INSECURITY
IN THE PAST 12 MONTHS, HAS LACK OF TRANSPORTATION KEPT YOU FROM MEETINGS, WORK, OR FROM GETTING THINGS NEEDED FOR DAILY LIVING?: NO

## 2021-12-15 SDOH — ECONOMIC STABILITY: TRANSPORTATION INSECURITY
IN THE PAST 12 MONTHS, HAS LACK OF RELIABLE TRANSPORTATION KEPT YOU FROM MEDICAL APPOINTMENTS, MEETINGS, WORK OR FROM GETTING THINGS NEEDED FOR DAILY LIVING?: NO

## 2021-12-15 SDOH — HEALTH STABILITY: PHYSICAL HEALTH: ON AVERAGE, HOW MANY MINUTES DO YOU ENGAGE IN EXERCISE AT THIS LEVEL?: 0 MIN

## 2021-12-15 SDOH — ECONOMIC STABILITY: HOUSING INSECURITY: IN THE LAST 12 MONTHS, HOW MANY PLACES HAVE YOU LIVED?: 3

## 2021-12-15 SDOH — ECONOMIC STABILITY: FOOD INSECURITY: WITHIN THE PAST 12 MONTHS, THE FOOD YOU BOUGHT JUST DIDN'T LAST AND YOU DIDN'T HAVE MONEY TO GET MORE.: NEVER TRUE

## 2021-12-15 SDOH — ECONOMIC STABILITY: FOOD INSECURITY: WITHIN THE PAST 12 MONTHS, YOU WORRIED THAT YOUR FOOD WOULD RUN OUT BEFORE YOU GOT MONEY TO BUY MORE.: NEVER TRUE

## 2021-12-15 SDOH — ECONOMIC STABILITY: HOUSING INSECURITY
IN THE LAST 12 MONTHS, WAS THERE A TIME WHEN YOU DID NOT HAVE A STEADY PLACE TO SLEEP OR SLEPT IN A SHELTER (INCLUDING NOW)?: NO

## 2021-12-15 SDOH — HEALTH STABILITY: PHYSICAL HEALTH: ON AVERAGE, HOW MANY DAYS PER WEEK DO YOU ENGAGE IN MODERATE TO STRENUOUS EXERCISE (LIKE A BRISK WALK)?: 0 DAYS

## 2021-12-15 SDOH — ECONOMIC STABILITY: INCOME INSECURITY: HOW HARD IS IT FOR YOU TO PAY FOR THE VERY BASICS LIKE FOOD, HOUSING, MEDICAL CARE, AND HEATING?: NOT HARD AT ALL

## 2021-12-15 SDOH — HEALTH STABILITY: MENTAL HEALTH
STRESS IS WHEN SOMEONE FEELS TENSE, NERVOUS, ANXIOUS, OR CAN'T SLEEP AT NIGHT BECAUSE THEIR MIND IS TROUBLED. HOW STRESSED ARE YOU?: TO SOME EXTENT

## 2021-12-15 SDOH — ECONOMIC STABILITY: TRANSPORTATION INSECURITY
IN THE PAST 12 MONTHS, HAS THE LACK OF TRANSPORTATION KEPT YOU FROM MEDICAL APPOINTMENTS OR FROM GETTING MEDICATIONS?: NO

## 2021-12-15 SDOH — ECONOMIC STABILITY: INCOME INSECURITY: IN THE LAST 12 MONTHS, WAS THERE A TIME WHEN YOU WERE NOT ABLE TO PAY THE MORTGAGE OR RENT ON TIME?: NO

## 2021-12-15 ASSESSMENT — LIFESTYLE VARIABLES
HOW OFTEN DO YOU HAVE SIX OR MORE DRINKS ON ONE OCCASION: NEVER
HOW MANY STANDARD DRINKS CONTAINING ALCOHOL DO YOU HAVE ON A TYPICAL DAY: 1 OR 2
HOW OFTEN DO YOU HAVE A DRINK CONTAINING ALCOHOL: MONTHLY OR LESS

## 2021-12-15 ASSESSMENT — SOCIAL DETERMINANTS OF HEALTH (SDOH)
HOW HARD IS IT FOR YOU TO PAY FOR THE VERY BASICS LIKE FOOD, HOUSING, MEDICAL CARE, AND HEATING?: NOT HARD AT ALL
HOW OFTEN DO YOU ATTENT MEETINGS OF THE CLUB OR ORGANIZATION YOU BELONG TO?: NEVER
HOW OFTEN DO YOU ATTEND CHURCH OR RELIGIOUS SERVICES?: NEVER
IN A TYPICAL WEEK, HOW MANY TIMES DO YOU TALK ON THE PHONE WITH FAMILY, FRIENDS, OR NEIGHBORS?: TWICE A WEEK
HOW MANY DRINKS CONTAINING ALCOHOL DO YOU HAVE ON A TYPICAL DAY WHEN YOU ARE DRINKING: 1 OR 2
HOW OFTEN DO YOU HAVE SIX OR MORE DRINKS ON ONE OCCASION: NEVER
ARE YOU MARRIED, WIDOWED, DIVORCED, SEPARATED, NEVER MARRIED, OR LIVING WITH A PARTNER?: LIVING WITH PARTNER
DO YOU BELONG TO ANY CLUBS OR ORGANIZATIONS SUCH AS CHURCH GROUPS UNIONS, FRATERNAL OR ATHLETIC GROUPS, OR SCHOOL GROUPS?: NO
HOW OFTEN DO YOU GET TOGETHER WITH FRIENDS OR RELATIVES?: NEVER
HOW OFTEN DO YOU HAVE A DRINK CONTAINING ALCOHOL: MONTHLY OR LESS
IN A TYPICAL WEEK, HOW MANY TIMES DO YOU TALK ON THE PHONE WITH FAMILY, FRIENDS, OR NEIGHBORS?: TWICE A WEEK
ARE YOU MARRIED, WIDOWED, DIVORCED, SEPARATED, NEVER MARRIED, OR LIVING WITH A PARTNER?: LIVING WITH PARTNER
HOW OFTEN DO YOU GET TOGETHER WITH FRIENDS OR RELATIVES?: NEVER
WITHIN THE PAST 12 MONTHS, YOU WORRIED THAT YOUR FOOD WOULD RUN OUT BEFORE YOU GOT THE MONEY TO BUY MORE: NEVER TRUE
HOW OFTEN DO YOU ATTENT MEETINGS OF THE CLUB OR ORGANIZATION YOU BELONG TO?: NEVER
HOW OFTEN DO YOU ATTEND CHURCH OR RELIGIOUS SERVICES?: NEVER
DO YOU BELONG TO ANY CLUBS OR ORGANIZATIONS SUCH AS CHURCH GROUPS UNIONS, FRATERNAL OR ATHLETIC GROUPS, OR SCHOOL GROUPS?: NO

## 2021-12-15 ASSESSMENT — FIBROSIS 4 INDEX: FIB4 SCORE: 0.71

## 2021-12-15 NOTE — PROGRESS NOTES
Subjective  Chief Complaint  Chief Complaint   Patient presents with   • Gynecologic Exam     History of Present Illness  Amelia Thakkar is a 61 y.o. female who presents for annual exam. She is feeling well and denies any complaints.    Ob-Gyn/ History:    Patient has GYN provider: no  /Para:  .  Last Pap Smear:  3+ years in [place]. Yes x1 history of abnormal pap smears; follow up normal.   Gyn Surgery:  None.  Does have history of lumpectomy in  (right).   Current Contraceptive Method:  None. No currently sexually active.  Last menstrual period:  Post-menopausal.    No significant bloating/fluid retention, pelvic pain, or dyspareunia. No vaginal discharge  Post-menopausal bleeding: None.  Urinary incontinence: None.    Health Maintenance  Advanced directive: Consider at age 65.   Osteoporosis Screen/ DEXA: Consider at age 65.   PT/vit D for falls prevention: Consider at age 65.   Cholesterol Screening: Lipid panel ordered 2021.   Diabetes Screening: A1c, fasting glucose ordered 2021.   Aspirin Use: The ASCVD Risk score (Jeremias CHRISTOPHER Jr, et al., 2013) failed to calculate.  Pending lipid panel.   Diet: Advised on heart healthy, low sodium diet.   Exercise: Advised on engaging in physical activity at least 30 minutes a day, most days of the week.   Substance Abuse: N/A.   Safe in relationship.  Seat belts, bike helmet, gun safety discussed.  Sun protection used.    Cancer screening  Colorectal Cancer Screening:  Colonoscopy referral done 2021.    Lung Cancer Screening: Never smoker, nicotine user.    Cervical Cancer Screening: Complete today.   Breast Cancer Screening:  Mammogram scheduled for 1/10/2022.     Infectious disease screening/Immunizations  --STI Screening: Deferred.   --Practices safe sex.  --HIV Screening: Deferred.   --Hepatitis C Screening: Deferred.   --Immunizations:  Shingles, TDaP, influenza, Covid-19 booster vaccines needed; however, vaccines unavailable at this time  in clinic.  Advised to get vaccines at preferred pharmacy should elect to receive.     Past Medical History    Allergies: Other drug, Phenazopyridine, Phenazopyridine hcl, and Azo-gantrisin  History reviewed. No pertinent past medical history.  Past Surgical History:   Procedure Laterality Date   • NASAL SEPTAL RECONSTRUCTION  08/2021   • LUMPECTOMY Right 1988     Current Outpatient Medications Ordered in Epic   Medication Sig Dispense Refill   • albuterol 108 (90 Base) MCG/ACT Aero Soln inhalation aerosol      • WIXELA INHUB 100-50 MCG/DOSE AEROSOL POWDER, BREATH ACTIVATED        No current Epic-ordered facility-administered medications on file.     Family History:    Family History   Problem Relation Age of Onset   • Diabetes Mother    • Diabetes Father    • Hypertension Father    • Hyperlipidemia Father    • Cancer Paternal Aunt         breast   • Hypertension Paternal Aunt    • Hypertension Paternal Uncle    • Cancer Maternal Grandfather         skin   • Cancer Other         uterine - cousin paternal   • Heart Disease Neg Hx    • Stroke Neg Hx       Personal/Social History:    Social History     Tobacco Use   • Smoking status: Never Smoker   • Smokeless tobacco: Never Used   Vaping Use   • Vaping Use: Never used   Substance Use Topics   • Alcohol use: Yes     Comment: occ   • Drug use: Not Currently     Social History     Social History Narrative   • Not on file     Review of Systems   General: Negative for fever/chills and expected weight change.  Eyes:  Negative for vision changes, eye pain.  ENT:  Negative for hearing changes, ear pain, congestion, sore throat, and neck pain.   Respiratory:  Negative for cough and dyspnea.    Cardiovascular:  Negative for chest pain and palpitations.  Gastrointestinal:  Negative for nausea/vomiting, changes in bowel habits, and abdominal pain.   Genitourinary:  Negative for dysuria and hematuria.   Musculoskeletal:  Negative for myalgias.   Skin:  Negative for rash.  "  Heme/Lymph:  Does not bruise/bleed easily.   Neurological:  Negative for numbness/tingling and headaches.   Psychiatric/Behavioral:  Negative for depression.  The patient is not nervous/anxious.      Objective  Physical Exam:   /84 (BP Location: Left arm, Patient Position: Sitting, BP Cuff Size: Adult)   Pulse 82   Temp 36.8 °C (98.2 °F) (Temporal)   Resp 12   Ht 1.727 m (5' 8\")   Wt 77.1 kg (170 lb)   SpO2 98%  Body mass index is 25.85 kg/m².  Wt Readings from Last 4 Encounters:   12/15/21 77.1 kg (170 lb)   11/30/21 75.7 kg (166 lb 12.8 oz)   11/02/21 71.7 kg (158 lb)   10/23/21 73 kg (161 lb)     Constitutional: Well-developed and well-nourished. Not diaphoretic. No distress.   Skin: Skin is warm and dry. No rash noted.  Head: Atraumatic without lesions.  Eyes: Conjunctivae and extraocular motions are normal. Pupils are equal, round, and reactive to light. No scleral icterus.   Ears:  External ears unremarkable. Tympanic membranes clear and intact.  Nose: Nares patent. Septum midline. Turbinates without erythema nor edema. No discharge.   Mouth/Throat: Dentition is good. Tongue normal. Oropharynx is clear and moist. Posterior pharynx without erythema or exudates.  Neck: Supple, trachea midline. Normal range of motion. No thyromegaly present. No lymphadenopathy--cervical or supraclavicular.  Cardiovascular: Regular rate and rhythm, S1 and S2 without murmur, rubs, or gallops.  Lungs: Normal inspiratory effort, CTA bilaterally, no wheezes/rhonchi/rales  Breast: Deferred.   Abdomen: Soft, non tender, and without distention. Active bowel sounds in all four quadrants. No rebound, guarding, masses or HSM.  :Perineum and external genitalia normal without rash. Vagina with normal and physiologic discharge. Cervix without visible lesions or discharge. Bimanual exam without adnexal masses or cervical motion tenderness.  Extremities: No cyanosis, clubbing, erythema, nor edema. Distal pulses intact and " symmetric.   Musculoskeletal: All major joints AROM full in all directions without pain.  Neurologic: Grossly intact.  DTRs 2+/3 and symmetric.  No cranial nerve deficit.  Sensation intact.   Psychiatric:  Behavior, mood, and affect are appropriate.    A chaperone was offered to the patient during today's exam. Chaperone name: Shawn (MA) was present.    Assessment/Plan  1. Encounter for gynecological examination  THINPREP PAP WITH HPV   2. Screening for cervical cancer  THINPREP PAP WITH HPV   3. Screening for human papillomavirus  THINPREP PAP WITH HPV     Health Maintenance: Completed  Labs per orders.  Immunizations per orders.  Patient counseled about skin care, diet, supplements, prenatal vitamins, safe sex and exercise.    Follow-up: No follow-ups on file.    I have placed the above orders and discussed them with an approved delegating provider.  The MA is performing the below orders under the direction of Dr. Leone.     Please note that this dictation was created using voice recognition software. I have made every reasonable attempt to correct obvious errors, but I expect that there are errors of grammar and possibly content that I did not discover before finalizing the note.    JOSE Chairez  [facility]

## 2021-12-16 DIAGNOSIS — Z12.4 SCREENING FOR CERVICAL CANCER: ICD-10-CM

## 2021-12-16 DIAGNOSIS — Z11.51 SCREENING FOR HUMAN PAPILLOMAVIRUS: ICD-10-CM

## 2021-12-16 DIAGNOSIS — Z01.419 ENCOUNTER FOR GYNECOLOGICAL EXAMINATION: ICD-10-CM

## 2021-12-16 LAB
CYTOLOGY REG CYTOL: NORMAL
HPV HR 12 DNA CVX QL NAA+PROBE: NEGATIVE
HPV16 DNA SPEC QL NAA+PROBE: NEGATIVE
HPV18 DNA SPEC QL NAA+PROBE: NEGATIVE
SPECIMEN SOURCE: NORMAL

## 2021-12-20 ENCOUNTER — HOSPITAL ENCOUNTER (OUTPATIENT)
Dept: RADIOLOGY | Facility: MEDICAL CENTER | Age: 61
End: 2021-12-20
Payer: COMMERCIAL

## 2021-12-21 ENCOUNTER — TELEPHONE (OUTPATIENT)
Dept: MEDICAL GROUP | Facility: PHYSICIAN GROUP | Age: 61
End: 2021-12-21

## 2021-12-21 NOTE — TELEPHONE ENCOUNTER
Phone Number Called: [phone]    Call outcome: Spoke to patient regarding message below.    Message: Called to inform patient of message below.      ----- Message from TARI Chairez sent at 12/21/2021 11:58 AM PST -----  Hi,    Can you please call Amelia and let her know that unfortunately her pap smear was unsatisfactory for evaluation due to not enough cells on her pap.  I do apologize for this.  She can come back into the office in 2-4 months to complete this again. Otherwise, her HPV testing did return negative for high risk genotypes.    Thank you, Janessa

## 2022-01-07 ENCOUNTER — HOSPITAL ENCOUNTER (OUTPATIENT)
Dept: LAB | Facility: MEDICAL CENTER | Age: 62
End: 2022-01-07
Attending: NURSE PRACTITIONER
Payer: COMMERCIAL

## 2022-01-07 ENCOUNTER — HOSPITAL ENCOUNTER (OUTPATIENT)
Dept: LAB | Facility: MEDICAL CENTER | Age: 62
End: 2022-01-07
Attending: PHYSICIAN ASSISTANT
Payer: COMMERCIAL

## 2022-01-07 DIAGNOSIS — Z83.42 FAMILY HISTORY OF HIGH CHOLESTEROL: ICD-10-CM

## 2022-01-07 DIAGNOSIS — Z11.59 NEED FOR HEPATITIS C SCREENING TEST: ICD-10-CM

## 2022-01-07 DIAGNOSIS — Z83.3 FAMILY HISTORY OF DIABETES MELLITUS (DM): ICD-10-CM

## 2022-01-07 LAB
ALBUMIN SERPL BCP-MCNC: 4.4 G/DL (ref 3.2–4.9)
ALBUMIN SERPL BCP-MCNC: 4.7 G/DL (ref 3.2–4.9)
ALBUMIN/GLOB SERPL: 1.7 G/DL
ALBUMIN/GLOB SERPL: 2 G/DL
ALP SERPL-CCNC: 79 U/L (ref 30–99)
ALP SERPL-CCNC: 80 U/L (ref 30–99)
ALT SERPL-CCNC: 17 U/L (ref 2–50)
ALT SERPL-CCNC: 17 U/L (ref 2–50)
ANION GAP SERPL CALC-SCNC: 15 MMOL/L (ref 7–16)
ANION GAP SERPL CALC-SCNC: 15 MMOL/L (ref 7–16)
AST SERPL-CCNC: 18 U/L (ref 12–45)
AST SERPL-CCNC: 18 U/L (ref 12–45)
BASOPHILS # BLD AUTO: 1.2 % (ref 0–1.8)
BASOPHILS # BLD: 0.06 K/UL (ref 0–0.12)
BILIRUB SERPL-MCNC: 0.5 MG/DL (ref 0.1–1.5)
BILIRUB SERPL-MCNC: 0.5 MG/DL (ref 0.1–1.5)
BUN SERPL-MCNC: 15 MG/DL (ref 8–22)
BUN SERPL-MCNC: 15 MG/DL (ref 8–22)
CALCIUM SERPL-MCNC: 9.6 MG/DL (ref 8.5–10.5)
CALCIUM SERPL-MCNC: 9.7 MG/DL (ref 8.5–10.5)
CHLORIDE SERPL-SCNC: 105 MMOL/L (ref 96–112)
CHLORIDE SERPL-SCNC: 106 MMOL/L (ref 96–112)
CHOLEST SERPL-MCNC: 245 MG/DL (ref 100–199)
CHOLEST SERPL-MCNC: 245 MG/DL (ref 100–199)
CO2 SERPL-SCNC: 20 MMOL/L (ref 20–33)
CO2 SERPL-SCNC: 20 MMOL/L (ref 20–33)
CREAT SERPL-MCNC: 0.86 MG/DL (ref 0.5–1.4)
CREAT SERPL-MCNC: 0.87 MG/DL (ref 0.5–1.4)
EOSINOPHIL # BLD AUTO: 0.2 K/UL (ref 0–0.51)
EOSINOPHIL NFR BLD: 4.1 % (ref 0–6.9)
ERYTHROCYTE [DISTWIDTH] IN BLOOD BY AUTOMATED COUNT: 45.6 FL (ref 35.9–50)
EST. AVERAGE GLUCOSE BLD GHB EST-MCNC: 108 MG/DL
FASTING STATUS PATIENT QL REPORTED: NORMAL
FASTING STATUS PATIENT QL REPORTED: NORMAL
GLOBULIN SER CALC-MCNC: 2.3 G/DL (ref 1.9–3.5)
GLOBULIN SER CALC-MCNC: 2.6 G/DL (ref 1.9–3.5)
GLUCOSE SERPL-MCNC: 90 MG/DL (ref 65–99)
GLUCOSE SERPL-MCNC: 91 MG/DL (ref 65–99)
HBA1C MFR BLD: 5.4 % (ref 4–5.6)
HCT VFR BLD AUTO: 45 % (ref 37–47)
HCV AB SER QL: NORMAL
HDLC SERPL-MCNC: 54 MG/DL
HGB BLD-MCNC: 14.8 G/DL (ref 12–16)
IMM GRANULOCYTES # BLD AUTO: 0.01 K/UL (ref 0–0.11)
IMM GRANULOCYTES NFR BLD AUTO: 0.2 % (ref 0–0.9)
LDLC SERPL CALC-MCNC: 170 MG/DL
LYMPHOCYTES # BLD AUTO: 1.67 K/UL (ref 1–4.8)
LYMPHOCYTES NFR BLD: 34.6 % (ref 22–41)
MCH RBC QN AUTO: 32 PG (ref 27–33)
MCHC RBC AUTO-ENTMCNC: 32.9 G/DL (ref 33.6–35)
MCV RBC AUTO: 97.2 FL (ref 81.4–97.8)
MONOCYTES # BLD AUTO: 0.41 K/UL (ref 0–0.85)
MONOCYTES NFR BLD AUTO: 8.5 % (ref 0–13.4)
NEUTROPHILS # BLD AUTO: 2.48 K/UL (ref 2–7.15)
NEUTROPHILS NFR BLD: 51.4 % (ref 44–72)
NRBC # BLD AUTO: 0 K/UL
NRBC BLD-RTO: 0 /100 WBC
PLATELET # BLD AUTO: 282 K/UL (ref 164–446)
PMV BLD AUTO: 11.3 FL (ref 9–12.9)
POTASSIUM SERPL-SCNC: 4.2 MMOL/L (ref 3.6–5.5)
POTASSIUM SERPL-SCNC: 4.3 MMOL/L (ref 3.6–5.5)
PROT SERPL-MCNC: 7 G/DL (ref 6–8.2)
PROT SERPL-MCNC: 7 G/DL (ref 6–8.2)
RBC # BLD AUTO: 4.63 M/UL (ref 4.2–5.4)
SODIUM SERPL-SCNC: 140 MMOL/L (ref 135–145)
SODIUM SERPL-SCNC: 141 MMOL/L (ref 135–145)
TRIGL SERPL-MCNC: 107 MG/DL (ref 0–149)
TRIGL SERPL-MCNC: 108 MG/DL (ref 0–149)
WBC # BLD AUTO: 4.8 K/UL (ref 4.8–10.8)

## 2022-01-07 PROCEDURE — 83036 HEMOGLOBIN GLYCOSYLATED A1C: CPT

## 2022-01-07 PROCEDURE — 84478 ASSAY OF TRIGLYCERIDES: CPT

## 2022-01-07 PROCEDURE — 80053 COMPREHEN METABOLIC PANEL: CPT

## 2022-01-07 PROCEDURE — 85025 COMPLETE CBC W/AUTO DIFF WBC: CPT

## 2022-01-07 PROCEDURE — G0472 HEP C SCREEN HIGH RISK/OTHER: HCPCS

## 2022-01-07 PROCEDURE — 80061 LIPID PANEL: CPT

## 2022-01-07 PROCEDURE — 80053 COMPREHEN METABOLIC PANEL: CPT | Mod: 91

## 2022-01-07 PROCEDURE — 82465 ASSAY BLD/SERUM CHOLESTEROL: CPT

## 2022-01-07 PROCEDURE — 36415 COLL VENOUS BLD VENIPUNCTURE: CPT

## 2022-01-10 ENCOUNTER — HOSPITAL ENCOUNTER (OUTPATIENT)
Dept: RADIOLOGY | Facility: MEDICAL CENTER | Age: 62
End: 2022-01-10
Attending: NURSE PRACTITIONER
Payer: COMMERCIAL

## 2022-01-10 DIAGNOSIS — Z12.31 ENCOUNTER FOR SCREENING MAMMOGRAM FOR BREAST CANCER: ICD-10-CM

## 2022-01-10 DIAGNOSIS — Z98.890 HISTORY OF LUMPECTOMY OF RIGHT BREAST: ICD-10-CM

## 2022-01-10 PROCEDURE — 77063 BREAST TOMOSYNTHESIS BI: CPT

## 2022-01-11 ENCOUNTER — PATIENT MESSAGE (OUTPATIENT)
Dept: MEDICAL GROUP | Facility: PHYSICIAN GROUP | Age: 62
End: 2022-01-11

## 2022-01-11 DIAGNOSIS — R92.30 DENSE BREAST TISSUE ON MAMMOGRAM: ICD-10-CM

## 2022-01-12 RX ORDER — HYDROXYZINE HYDROCHLORIDE 25 MG/1
TABLET, FILM COATED ORAL
COMMUNITY
Start: 2021-12-29 | End: 2023-09-15

## 2022-01-12 RX ORDER — TRIAMCINOLONE ACETONIDE 1 MG/G
OINTMENT TOPICAL
COMMUNITY
Start: 2021-12-29 | End: 2023-09-15

## 2022-01-18 ENCOUNTER — OFFICE VISIT (OUTPATIENT)
Dept: MEDICAL GROUP | Facility: PHYSICIAN GROUP | Age: 62
End: 2022-01-18
Payer: COMMERCIAL

## 2022-01-18 ENCOUNTER — HOSPITAL ENCOUNTER (OUTPATIENT)
Facility: MEDICAL CENTER | Age: 62
End: 2022-01-18
Attending: NURSE PRACTITIONER
Payer: COMMERCIAL

## 2022-01-18 VITALS
SYSTOLIC BLOOD PRESSURE: 114 MMHG | WEIGHT: 170 LBS | OXYGEN SATURATION: 100 % | DIASTOLIC BLOOD PRESSURE: 80 MMHG | HEIGHT: 68 IN | RESPIRATION RATE: 12 BRPM | BODY MASS INDEX: 25.76 KG/M2 | TEMPERATURE: 98.5 F | HEART RATE: 77 BPM

## 2022-01-18 DIAGNOSIS — Z12.4 SCREENING FOR CERVICAL CANCER: ICD-10-CM

## 2022-01-18 DIAGNOSIS — Z11.51 SCREENING FOR HUMAN PAPILLOMAVIRUS: ICD-10-CM

## 2022-01-18 PROCEDURE — 99999 PR NO CHARGE: CPT | Performed by: NURSE PRACTITIONER

## 2022-01-18 PROCEDURE — 87624 HPV HI-RISK TYP POOLED RSLT: CPT

## 2022-01-18 PROCEDURE — 88175 CYTOPATH C/V AUTO FLUID REDO: CPT

## 2022-01-18 RX ORDER — ISOTRETINOIN 40 MG/1
CAPSULE ORAL
COMMUNITY
Start: 2022-01-10 | End: 2023-09-08

## 2022-01-18 ASSESSMENT — PATIENT HEALTH QUESTIONNAIRE - PHQ9: CLINICAL INTERPRETATION OF PHQ2 SCORE: 0

## 2022-01-18 ASSESSMENT — FIBROSIS 4 INDEX: FIB4 SCORE: 0.91

## 2022-01-18 NOTE — PROGRESS NOTES
"Subjective  Chief Complaint  Chief Complaint   Patient presents with   • Gynecologic Exam     History of Present Illness  Amelia presents today to repeat her pap smear.  Last pap smear done 12/15/2021, which was negative for HPV.  Unfortunately, specimen was unsatisfactory for evaluation.  She denies any other concerns today.      Past Medical History    Allergies: Other drug, Phenazopyridine, Phenazopyridine hcl, and Azo-gantrisin  History reviewed. No pertinent past medical history.  Current Outpatient Medications Ordered in Epic   Medication Sig Dispense Refill   • isotretinoin (ACCUTANE) 40 MG capsule      • hydrOXYzine HCl (ATARAX) 25 MG Tab      • triamcinolone acetonide (KENALOG) 0.1 % Ointment      • albuterol 108 (90 Base) MCG/ACT Aero Soln inhalation aerosol      • WIXELA INHUB 100-50 MCG/DOSE AEROSOL POWDER, BREATH ACTIVATED        No current Epic-ordered facility-administered medications on file.     Review of Systems  See below.     Objective  Physical Exam  /80 (BP Location: Right arm, Patient Position: Sitting, BP Cuff Size: Adult)   Pulse 77   Temp 36.9 °C (98.5 °F) (Temporal)   Resp 12   Ht 1.727 m (5' 8\")   Wt 77.1 kg (170 lb)   SpO2 100%  Body mass index is 25.85 kg/m².  General: Alert, no distress, well-groomed.  Skin: Warm, dry, good turgor, no rashes in visible areas.  Respiratory: Unlabored respiratory effort, no cough.  Musculoskeletal: Normal gait, moves all extremities.  :Perineum and external genitalia normal without rash. Vagina with normal and physiologic and odorless discharge. Cervix without visible lesions or discharge. Neuro: Grossly non-focal.   Psych: Alert and oriented x3, normal affect and mood.    Assessment/Plan  61 y.o. female with the following issues.    1. Screening for cervical cancer  THINPREP PAP WITH HPV   2. Screening for human papillomavirus  THINPREP PAP WITH HPV   > Due to unsatisfactory sample from last pap smear, repeat no charge pap smear done today. "     Return if symptoms worsen or fail to improve.    I have placed the below orders and discussed them with an approved delegating provider.  The MA is performing the below orders under the direction of Dr. Leone.    Please note that this dictation was created using voice recognition software. I have worked with consultants from the vendor as well as technical experts from [facility] to optimize the interface. I have made every reasonable attempt to correct obvious errors, but I expect that there are errors of grammar and possibly content that I did not discover before finalizing the note.    JOSE Chairez  [facility]

## 2022-01-19 DIAGNOSIS — Z11.51 SCREENING FOR HUMAN PAPILLOMAVIRUS: ICD-10-CM

## 2022-01-19 DIAGNOSIS — Z12.4 SCREENING FOR CERVICAL CANCER: ICD-10-CM

## 2022-02-09 ENCOUNTER — HOSPITAL ENCOUNTER (OUTPATIENT)
Dept: LAB | Facility: MEDICAL CENTER | Age: 62
End: 2022-02-09
Attending: PHYSICIAN ASSISTANT
Payer: COMMERCIAL

## 2022-02-09 LAB
ALBUMIN SERPL BCP-MCNC: 4.7 G/DL (ref 3.2–4.9)
ALBUMIN/GLOB SERPL: 2 G/DL
ALP SERPL-CCNC: 86 U/L (ref 30–99)
ALT SERPL-CCNC: 17 U/L (ref 2–50)
ANION GAP SERPL CALC-SCNC: 11 MMOL/L (ref 7–16)
AST SERPL-CCNC: 21 U/L (ref 12–45)
BILIRUB SERPL-MCNC: 0.5 MG/DL (ref 0.1–1.5)
BUN SERPL-MCNC: 17 MG/DL (ref 8–22)
CALCIUM SERPL-MCNC: 9.7 MG/DL (ref 8.5–10.5)
CHLORIDE SERPL-SCNC: 104 MMOL/L (ref 96–112)
CHOLEST SERPL-MCNC: 263 MG/DL (ref 100–199)
CO2 SERPL-SCNC: 24 MMOL/L (ref 20–33)
CREAT SERPL-MCNC: 0.89 MG/DL (ref 0.5–1.4)
GLOBULIN SER CALC-MCNC: 2.3 G/DL (ref 1.9–3.5)
GLUCOSE SERPL-MCNC: 94 MG/DL (ref 65–99)
HDLC SERPL-MCNC: 50 MG/DL
LDLC SERPL CALC-MCNC: 173 MG/DL
POTASSIUM SERPL-SCNC: 4.5 MMOL/L (ref 3.6–5.5)
PROT SERPL-MCNC: 7 G/DL (ref 6–8.2)
SODIUM SERPL-SCNC: 139 MMOL/L (ref 135–145)
TRIGL SERPL-MCNC: 200 MG/DL (ref 0–149)

## 2022-02-09 PROCEDURE — 80061 LIPID PANEL: CPT

## 2022-02-09 PROCEDURE — 36415 COLL VENOUS BLD VENIPUNCTURE: CPT

## 2022-02-09 PROCEDURE — 80053 COMPREHEN METABOLIC PANEL: CPT

## 2022-02-11 ENCOUNTER — HOSPITAL ENCOUNTER (OUTPATIENT)
Dept: RADIOLOGY | Facility: MEDICAL CENTER | Age: 62
End: 2022-02-11
Attending: NURSE PRACTITIONER
Payer: COMMERCIAL

## 2022-02-11 DIAGNOSIS — R92.30 DENSE BREAST TISSUE ON MAMMOGRAM: ICD-10-CM

## 2022-02-11 PROCEDURE — 76641 ULTRASOUND BREAST COMPLETE: CPT

## 2022-02-24 ENCOUNTER — APPOINTMENT (OUTPATIENT)
Dept: LAB | Facility: MEDICAL CENTER | Age: 62
End: 2022-02-24
Payer: COMMERCIAL

## 2022-03-05 ENCOUNTER — HOSPITAL ENCOUNTER (OUTPATIENT)
Dept: LAB | Facility: MEDICAL CENTER | Age: 62
End: 2022-03-05
Attending: PHYSICIAN ASSISTANT
Payer: COMMERCIAL

## 2022-03-05 LAB
ALBUMIN SERPL BCP-MCNC: 4.7 G/DL (ref 3.2–4.9)
ALBUMIN/GLOB SERPL: 2.1 G/DL
ALP SERPL-CCNC: 85 U/L (ref 30–99)
ALT SERPL-CCNC: 11 U/L (ref 2–50)
ANION GAP SERPL CALC-SCNC: 13 MMOL/L (ref 7–16)
AST SERPL-CCNC: 17 U/L (ref 12–45)
BILIRUB SERPL-MCNC: 0.5 MG/DL (ref 0.1–1.5)
BUN SERPL-MCNC: 15 MG/DL (ref 8–22)
CALCIUM SERPL-MCNC: 9.5 MG/DL (ref 8.5–10.5)
CHLORIDE SERPL-SCNC: 108 MMOL/L (ref 96–112)
CHOLEST SERPL-MCNC: 203 MG/DL (ref 100–199)
CO2 SERPL-SCNC: 22 MMOL/L (ref 20–33)
CREAT SERPL-MCNC: 0.93 MG/DL (ref 0.5–1.4)
FASTING STATUS PATIENT QL REPORTED: NORMAL
GLOBULIN SER CALC-MCNC: 2.2 G/DL (ref 1.9–3.5)
GLUCOSE SERPL-MCNC: 86 MG/DL (ref 65–99)
HDLC SERPL-MCNC: 57 MG/DL
LDLC SERPL CALC-MCNC: 132 MG/DL
POTASSIUM SERPL-SCNC: 4.3 MMOL/L (ref 3.6–5.5)
PROT SERPL-MCNC: 6.9 G/DL (ref 6–8.2)
SODIUM SERPL-SCNC: 143 MMOL/L (ref 135–145)
TRIGL SERPL-MCNC: 69 MG/DL (ref 0–149)

## 2022-03-05 PROCEDURE — 80053 COMPREHEN METABOLIC PANEL: CPT

## 2022-03-05 PROCEDURE — 80061 LIPID PANEL: CPT

## 2022-03-05 PROCEDURE — 36415 COLL VENOUS BLD VENIPUNCTURE: CPT

## 2022-03-23 ENCOUNTER — OFFICE VISIT (OUTPATIENT)
Dept: MEDICAL GROUP | Facility: PHYSICIAN GROUP | Age: 62
End: 2022-03-23
Payer: COMMERCIAL

## 2022-03-23 VITALS
BODY MASS INDEX: 25.13 KG/M2 | DIASTOLIC BLOOD PRESSURE: 66 MMHG | HEIGHT: 68 IN | OXYGEN SATURATION: 96 % | WEIGHT: 165.8 LBS | HEART RATE: 75 BPM | SYSTOLIC BLOOD PRESSURE: 120 MMHG | TEMPERATURE: 97.4 F

## 2022-03-23 DIAGNOSIS — Z86.79 HISTORY OF VARICOSE VEINS OF LOWER EXTREMITY: ICD-10-CM

## 2022-03-23 DIAGNOSIS — J45.20 MILD INTERMITTENT ASTHMA WITHOUT COMPLICATION: ICD-10-CM

## 2022-03-23 DIAGNOSIS — L44.0 PRP (PITYRIASIS RUBRA PILARIS): ICD-10-CM

## 2022-03-23 DIAGNOSIS — K59.09 CHRONIC CONSTIPATION: ICD-10-CM

## 2022-03-23 DIAGNOSIS — Z85.828 HISTORY OF SKIN CANCER: ICD-10-CM

## 2022-03-23 DIAGNOSIS — Z76.89 ENCOUNTER TO ESTABLISH CARE: ICD-10-CM

## 2022-03-23 DIAGNOSIS — R60.9 PERIPHERAL EDEMA: ICD-10-CM

## 2022-03-23 DIAGNOSIS — Z98.890 S/P CORRECTION OF DEVIATED NASAL SEPTUM: ICD-10-CM

## 2022-03-23 DIAGNOSIS — R04.0 EPISTAXIS: ICD-10-CM

## 2022-03-23 DIAGNOSIS — E78.5 HYPERLIPIDEMIA, UNSPECIFIED HYPERLIPIDEMIA TYPE: ICD-10-CM

## 2022-03-23 PROBLEM — R60.0 PERIPHERAL EDEMA: Status: ACTIVE | Noted: 2022-03-23

## 2022-03-23 PROCEDURE — 99214 OFFICE O/P EST MOD 30 MIN: CPT | Performed by: STUDENT IN AN ORGANIZED HEALTH CARE EDUCATION/TRAINING PROGRAM

## 2022-03-23 ASSESSMENT — FIBROSIS 4 INDEX: FIB4 SCORE: 1.07

## 2022-03-23 NOTE — PROGRESS NOTES
Subjective:     Chief Complaint   Patient presents with   • Establish Care     Establish care. Patient wants referral to ENT.    • Epistaxis     HISTORY OF THE PRESENT ILLNESS: Patient is a 61 y.o. female. This pleasant patient is here today to establish care and discuss epistaxis. His/her prior PCP was JOSE Costa.    Patient states that since she is started Accutane for pityriasis rubra pilaris she has had occasional epistaxis.  She does report a history of nasal septum correction in August of last year while she was in [place] but has not been able to follow-up since she moved here.  She does not state that the bleeding is significant and typically self resolves.  Does report nasal dryness.  She is hoping to get a referral to ENT for follow-up.  Has been using olive oil which does help.    She is currently followed by dermatology (Dr. Leonor Rock) for PRP which is improved.  She was previously on Taltz as well.  She reports still skin dryness and intermittent itching that she uses topical cream and Atarax.  She does have a history of skin cancer but has no current concerns or lesions that she is worried about.    She is wondering if she could get an order for compression stockings.  She reports that if she does not use them she will have bilateral lower extremity edema and prefers to continue especially if she is working.  She cannot remember how many millimeters of mercury of pressure she uses.  She reports to have a history of varicose veins that either were stripped or ablated.    Asthma  Chronic x 5-6 years ago.  Originally had a diagnosis through pulmonary.  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  She is very sensitive to perfumes and sprays so tries to avoid.  She is only needed her albuterol once or twice in the past year and no longer using wixela 100-mcg.    Current Outpatient Medications Ordered in Epic   Medication Sig Dispense Refill   •  "isotretinoin (ACCUTANE) 40 MG capsule      • hydrOXYzine HCl (ATARAX) 25 MG Tab      • triamcinolone acetonide (KENALOG) 0.1 % Ointment      • albuterol 108 (90 Base) MCG/ACT Aero Soln inhalation aerosol        No current Epic-ordered facility-administered medications on file.     Health Maintenance: Patient interested in vaccinations but prefers to postpone as her current skin issues may have been related to vaccination.    ROS:   Gen: no fevers/chills, no changes in weight  Eyes: no changes in vision  ENT: no sore throat, no hearing loss  Pulm: no sob, no cough  CV: no chest pain, no palpitations  GI: no nausea/vomiting, no diarrhea  : no dysuria  MSk: no myalgias  Neuro: no headaches, no numbness/tingling  Heme/Lymph: no easy bruising      Objective:     Exam: /66 (BP Location: Left arm, Patient Position: Sitting, BP Cuff Size: Adult)   Pulse 75   Temp 36.3 °C (97.4 °F) (Temporal)   Ht 1.727 m (5' 8\")   Wt 75.2 kg (165 lb 12.8 oz)   SpO2 96%  Body mass index is 25.21 kg/m².    General: Well developed, well nourished in no acute distress.  Head: Normocephalic and atraumatic.  Eyes: Conjunctivae and extraocular motions are normal. Pupils are equal, round. No scleral icterus.   Ears:  External ears unremarkable.  Nose: Nares patent. Turbinates without erythema nor edema. No discharge.  Scant amount of scabbing without bleeding in the right naris.  Mouth/Throat: Oropharynx is clear and moist. Posterior pharynx without erythema or exudates.  Neck: Supple. Thyroid is not enlarged.  Pulmonary: Clear to ausculation.  Normal effort. No rales, ronchi, or wheezing.  Cardiovascular: Regular rate and rhythm without murmur.  Abdomen: Soft, nontender, nondistended. Normal bowel sounds. No HSM.  Neurologic: No gross/focal deficits. Normal gait.   Lymph: No cervical or supraclavicular lymph nodes are palpable  Skin: Warm and dry.  No obvious lesions. Skin is dry.  Musculoskeletal: No extremity cyanosis, clubbing, or " edema.  Wearing compression stockings.  Psych: Normal mood and affect. Alert and oriented x3. Judgment and insight is normal.    Labs: Reviewed from 2/9/2022, 3/5/2022    Assessment & Plan:   61 y.o. female with the following -    1. Encounter to establish care  Medical record reviewed and updated with patient.    2. Epistaxis  3. S/P correction of deviated nasal septum  Mild epistaxis occurring after start of Accutane.  Reviewed most recent labs.  Will obtain CBC with next labs and refer to ENT.  Discussed acute treatment of nosebleeds and recommend hydrating with AYR gel and use of humidifier.  - CBC WITH DIFFERENTIAL; Future  - Referral to ENT    4. PRP (pityriasis rubra pilaris)  5. History of skin cancer  Improving, continue care with dermatologist.    6. Hyperlipidemia, unspecified hyperlipidemia type  This has improved, dermatology screening monthly while on Accutane.    7. Mild intermittent asthma without complication  Chronic, reviewed history with patient.  Avoid triggers and recommend she carry her albuterol and use as needed.    8. Chronic constipation  This is a chronic condition.  Discussed things that might improve and okay to use over-the-counter treatment such as Colace or MiraLAX to titrate to soft stools.  Check TSH with next labs.  - TSH WITH REFLEX TO FT4; Future    9. Peripheral edema  10. History of varicose veins of lower extremity  This is a chronic condition, well controlled after vascular surgery and with use of compression.  Patient to check her socks at home and let me know what amount of pressure she is currently using so we can keep it the same and I will put a DME order in for her.    I spent a total of 30 minutes with record review, exam, communication with the patient, and documentation of this encounter.    Return in about 6 months (around 9/23/2022), or if symptoms worsen or fail to improve.    Please note that this dictation was created using voice recognition software. I have  made every reasonable attempt to correct obvious errors, but I expect that there are errors of grammar and possibly content that I did not discover before finalizing the note.

## 2022-03-23 NOTE — PATIENT INSTRUCTIONS
AYR gel (green box)    Colace 100mg once or twice a day  Miralax if needed to titrate to soft stools.    Nosebleed, Adult  A nosebleed is when blood comes out of the nose. Nosebleeds are common. Usually, they are not a sign of a serious condition.  Nosebleeds can happen if a small blood vessel in your nose starts to bleed or if the lining of your nose (mucous membrane) cracks. They are commonly caused by:  · Allergies.  · Colds.  · Picking your nose.  · Blowing your nose too hard.  · An injury from sticking an object into your nose or getting hit in the nose.  · Dry or cold air.  Less common causes of nosebleeds include:  · Toxic fumes.  · Something abnormal in the nose or in the air-filled spaces in the bones of the face (sinuses).  · Growths in the nose, such as polyps.  · Medicines or conditions that cause blood to clot slowly.  · Certain illnesses or procedures that irritate or dry out the nasal passages.  Follow these instructions at home:  When you have a nosebleed:    · Sit down and tilt your head slightly forward.  · Use a clean towel or tissue to pinch your nostrils under the bony part of your nose. After 10 minutes, let go of your nose and see if bleeding starts again. Do not release pressure before that time. If there is still bleeding, repeat the pinching and holding for 10 minutes until the bleeding stops.  · Do not place tissues or gauze in the nose to stop bleeding.  · Avoid lying down and avoid tilting your head backward. That may make blood collect in the throat and cause gagging or coughing.  · Use a nasal spray decongestant to help with a nosebleed as told by your health care provider.  · Do not use petroleum jelly or mineral oil in your nose. It can drip into your lungs.  After a nosebleed:  · Avoid blowing your nose or sniffing for a number of hours.  · Avoid straining, lifting, or bending at the waist for several days. You may resume other normal activities as you are able.  · Use saline spray  or a humidifier as told by your health care provider.  · Aspirin and blood thinners make bleeding more likely. If you are prescribed these medicines and you suffer from nosebleeds:  ? Ask your health care provider if you should stop taking the medicines or if you should adjust the dose.  ? Do not stop taking medicines that your health care provider has recommended unless told by your health care provider.  · If your nosebleed was caused by dry mucous membranes, use over-the-counter saline nasal spray or gel. This will keep the mucous membranes moist and allow them to heal. If you must use a lubricant:  ? Choose one that is water-soluble.  ? Use only as much as you need and use it only as often as needed.  ? Do not lie down until several hours after you use it.  Contact a health care provider if:  · You have a fever.  · You get nosebleeds often or more often than usual.  · You bruise very easily.  · You have a nosebleed from having something stuck in your nose.  · You have bleeding in your mouth.  · You vomit or cough up brown material.  · You have a nosebleed after you start a new medicine.  Get help right away if:  · You have a nosebleed after a fall or a head injury.  · Your nosebleed does not go away after 20 minutes.  · You feel dizzy or weak.  · You have unusual bleeding from other parts of your body.  · You have unusual bruising on other parts of your body.  · You become sweaty.  · You vomit blood.  This information is not intended to replace advice given to you by your health care provider. Make sure you discuss any questions you have with your health care provider.  Document Released: 09/27/2006 Document Revised: 03/19/2019 Document Reviewed: 07/04/2017  Elsevier Patient Education © 2020 [employer]

## 2022-03-24 NOTE — ASSESSMENT & PLAN NOTE
Chronic x 5-6 years ago.  Originally had a diagnosis through pulmonary.  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  She is very sensitive to perfumes and sprays so tries to avoid.  She is only needed her albuterol once or twice in the past year and no longer using wixela 100-mcg.

## 2022-03-30 ENCOUNTER — HOSPITAL ENCOUNTER (OUTPATIENT)
Dept: LAB | Facility: MEDICAL CENTER | Age: 62
End: 2022-03-30
Attending: STUDENT IN AN ORGANIZED HEALTH CARE EDUCATION/TRAINING PROGRAM
Payer: COMMERCIAL

## 2022-03-30 ENCOUNTER — HOSPITAL ENCOUNTER (OUTPATIENT)
Dept: LAB | Facility: MEDICAL CENTER | Age: 62
End: 2022-03-30
Attending: PHYSICIAN ASSISTANT
Payer: COMMERCIAL

## 2022-03-30 DIAGNOSIS — R04.0 EPISTAXIS: ICD-10-CM

## 2022-03-30 DIAGNOSIS — K59.09 CHRONIC CONSTIPATION: ICD-10-CM

## 2022-03-30 LAB
ALBUMIN SERPL BCP-MCNC: 4.5 G/DL (ref 3.2–4.9)
ALBUMIN/GLOB SERPL: 2.1 G/DL
ALP SERPL-CCNC: 86 U/L (ref 30–99)
ALT SERPL-CCNC: 11 U/L (ref 2–50)
ANION GAP SERPL CALC-SCNC: 11 MMOL/L (ref 7–16)
AST SERPL-CCNC: 17 U/L (ref 12–45)
BASOPHILS # BLD AUTO: 0.8 % (ref 0–1.8)
BASOPHILS # BLD: 0.04 K/UL (ref 0–0.12)
BILIRUB SERPL-MCNC: 0.4 MG/DL (ref 0.1–1.5)
BUN SERPL-MCNC: 14 MG/DL (ref 8–22)
CALCIUM SERPL-MCNC: 9.5 MG/DL (ref 8.5–10.5)
CHLORIDE SERPL-SCNC: 105 MMOL/L (ref 96–112)
CHOLEST SERPL-MCNC: 226 MG/DL (ref 100–199)
CO2 SERPL-SCNC: 24 MMOL/L (ref 20–33)
CREAT SERPL-MCNC: 0.8 MG/DL (ref 0.5–1.4)
EOSINOPHIL # BLD AUTO: 0.08 K/UL (ref 0–0.51)
EOSINOPHIL NFR BLD: 1.6 % (ref 0–6.9)
ERYTHROCYTE [DISTWIDTH] IN BLOOD BY AUTOMATED COUNT: 43.4 FL (ref 35.9–50)
FASTING STATUS PATIENT QL REPORTED: NORMAL
GFR SERPLBLD CREATININE-BSD FMLA CKD-EPI: 84 ML/MIN/1.73 M 2
GLOBULIN SER CALC-MCNC: 2.1 G/DL (ref 1.9–3.5)
GLUCOSE SERPL-MCNC: 82 MG/DL (ref 65–99)
HCT VFR BLD AUTO: 44.6 % (ref 37–47)
HDLC SERPL-MCNC: 49 MG/DL
HGB BLD-MCNC: 14.6 G/DL (ref 12–16)
IMM GRANULOCYTES # BLD AUTO: 0.01 K/UL (ref 0–0.11)
IMM GRANULOCYTES NFR BLD AUTO: 0.2 % (ref 0–0.9)
LDLC SERPL CALC-MCNC: 150 MG/DL
LYMPHOCYTES # BLD AUTO: 1.69 K/UL (ref 1–4.8)
LYMPHOCYTES NFR BLD: 33 % (ref 22–41)
MCH RBC QN AUTO: 31.7 PG (ref 27–33)
MCHC RBC AUTO-ENTMCNC: 32.7 G/DL (ref 33.6–35)
MCV RBC AUTO: 97 FL (ref 81.4–97.8)
MONOCYTES # BLD AUTO: 0.43 K/UL (ref 0–0.85)
MONOCYTES NFR BLD AUTO: 8.4 % (ref 0–13.4)
NEUTROPHILS # BLD AUTO: 2.87 K/UL (ref 2–7.15)
NEUTROPHILS NFR BLD: 56 % (ref 44–72)
NRBC # BLD AUTO: 0 K/UL
NRBC BLD-RTO: 0 /100 WBC
PLATELET # BLD AUTO: 287 K/UL (ref 164–446)
PMV BLD AUTO: 10.9 FL (ref 9–12.9)
POTASSIUM SERPL-SCNC: 5.1 MMOL/L (ref 3.6–5.5)
PROT SERPL-MCNC: 6.6 G/DL (ref 6–8.2)
RBC # BLD AUTO: 4.6 M/UL (ref 4.2–5.4)
SODIUM SERPL-SCNC: 140 MMOL/L (ref 135–145)
TRIGL SERPL-MCNC: 137 MG/DL (ref 0–149)
TSH SERPL DL<=0.005 MIU/L-ACNC: 2.3 UIU/ML (ref 0.38–5.33)
WBC # BLD AUTO: 5.1 K/UL (ref 4.8–10.8)

## 2022-03-30 PROCEDURE — 84443 ASSAY THYROID STIM HORMONE: CPT

## 2022-03-30 PROCEDURE — 36415 COLL VENOUS BLD VENIPUNCTURE: CPT

## 2022-03-30 PROCEDURE — 80053 COMPREHEN METABOLIC PANEL: CPT

## 2022-03-30 PROCEDURE — 80061 LIPID PANEL: CPT

## 2022-03-30 PROCEDURE — 85025 COMPLETE CBC W/AUTO DIFF WBC: CPT

## 2022-05-10 ENCOUNTER — HOSPITAL ENCOUNTER (OUTPATIENT)
Dept: LAB | Facility: MEDICAL CENTER | Age: 62
End: 2022-05-10
Attending: PHYSICIAN ASSISTANT
Payer: COMMERCIAL

## 2022-05-10 LAB
ALBUMIN SERPL BCP-MCNC: 4.3 G/DL (ref 3.2–4.9)
ALBUMIN/GLOB SERPL: 1.6 G/DL
ALP SERPL-CCNC: 76 U/L (ref 30–99)
ALT SERPL-CCNC: 12 U/L (ref 2–50)
ANION GAP SERPL CALC-SCNC: 10 MMOL/L (ref 7–16)
AST SERPL-CCNC: 16 U/L (ref 12–45)
BILIRUB SERPL-MCNC: 0.6 MG/DL (ref 0.1–1.5)
BUN SERPL-MCNC: 13 MG/DL (ref 8–22)
CALCIUM SERPL-MCNC: 9.4 MG/DL (ref 8.5–10.5)
CHLORIDE SERPL-SCNC: 105 MMOL/L (ref 96–112)
CHOLEST SERPL-MCNC: 228 MG/DL (ref 100–199)
CO2 SERPL-SCNC: 26 MMOL/L (ref 20–33)
CREAT SERPL-MCNC: 0.79 MG/DL (ref 0.5–1.4)
FASTING STATUS PATIENT QL REPORTED: NORMAL
GFR SERPLBLD CREATININE-BSD FMLA CKD-EPI: 86 ML/MIN/1.73 M 2
GLOBULIN SER CALC-MCNC: 2.7 G/DL (ref 1.9–3.5)
GLUCOSE SERPL-MCNC: 90 MG/DL (ref 65–99)
HDLC SERPL-MCNC: 58 MG/DL
LDLC SERPL CALC-MCNC: 147 MG/DL
POTASSIUM SERPL-SCNC: 4.8 MMOL/L (ref 3.6–5.5)
PROT SERPL-MCNC: 7 G/DL (ref 6–8.2)
SODIUM SERPL-SCNC: 141 MMOL/L (ref 135–145)
TRIGL SERPL-MCNC: 115 MG/DL (ref 0–149)

## 2022-05-10 PROCEDURE — 80061 LIPID PANEL: CPT

## 2022-05-10 PROCEDURE — 80053 COMPREHEN METABOLIC PANEL: CPT

## 2022-05-10 PROCEDURE — 36415 COLL VENOUS BLD VENIPUNCTURE: CPT

## 2023-09-05 ENCOUNTER — APPOINTMENT (OUTPATIENT)
Dept: RADIOLOGY | Facility: MEDICAL CENTER | Age: 63
End: 2023-09-05
Attending: EMERGENCY MEDICINE
Payer: COMMERCIAL

## 2023-09-05 ENCOUNTER — HOSPITAL ENCOUNTER (EMERGENCY)
Facility: MEDICAL CENTER | Age: 63
End: 2023-09-05
Attending: EMERGENCY MEDICINE
Payer: COMMERCIAL

## 2023-09-05 VITALS
SYSTOLIC BLOOD PRESSURE: 151 MMHG | HEIGHT: 68 IN | BODY MASS INDEX: 25.76 KG/M2 | HEART RATE: 58 BPM | DIASTOLIC BLOOD PRESSURE: 85 MMHG | OXYGEN SATURATION: 99 % | RESPIRATION RATE: 19 BRPM | TEMPERATURE: 97.9 F | WEIGHT: 170 LBS

## 2023-09-05 DIAGNOSIS — S01.81XA FACIAL LACERATION, INITIAL ENCOUNTER: ICD-10-CM

## 2023-09-05 DIAGNOSIS — S80.01XA CONTUSION OF RIGHT KNEE, INITIAL ENCOUNTER: ICD-10-CM

## 2023-09-05 DIAGNOSIS — S01.01XA LACERATION OF SCALP, INITIAL ENCOUNTER: ICD-10-CM

## 2023-09-05 DIAGNOSIS — S69.91XA INJURY OF RIGHT WRIST, INITIAL ENCOUNTER: ICD-10-CM

## 2023-09-05 DIAGNOSIS — M25.561 ACUTE PAIN OF RIGHT KNEE: ICD-10-CM

## 2023-09-05 DIAGNOSIS — S70.01XA CONTUSION OF RIGHT HIP, INITIAL ENCOUNTER: ICD-10-CM

## 2023-09-05 DIAGNOSIS — S09.90XA CLOSED HEAD INJURY, INITIAL ENCOUNTER: ICD-10-CM

## 2023-09-05 DIAGNOSIS — S69.92XA INJURY OF LEFT HAND, INITIAL ENCOUNTER: ICD-10-CM

## 2023-09-05 PROCEDURE — 304999 HCHG REPAIR-SIMPLE/INTERMED LEVEL 1

## 2023-09-05 PROCEDURE — 73564 X-RAY EXAM KNEE 4 OR MORE: CPT | Mod: RT

## 2023-09-05 PROCEDURE — 72125 CT NECK SPINE W/O DYE: CPT

## 2023-09-05 PROCEDURE — 700102 HCHG RX REV CODE 250 W/ 637 OVERRIDE(OP): Performed by: EMERGENCY MEDICINE

## 2023-09-05 PROCEDURE — A9270 NON-COVERED ITEM OR SERVICE: HCPCS | Performed by: EMERGENCY MEDICINE

## 2023-09-05 PROCEDURE — 90471 IMMUNIZATION ADMIN: CPT

## 2023-09-05 PROCEDURE — 36415 COLL VENOUS BLD VENIPUNCTURE: CPT

## 2023-09-05 PROCEDURE — 29125 APPL SHORT ARM SPLINT STATIC: CPT

## 2023-09-05 PROCEDURE — 700101 HCHG RX REV CODE 250: Performed by: EMERGENCY MEDICINE

## 2023-09-05 PROCEDURE — 73130 X-RAY EXAM OF HAND: CPT | Mod: LT

## 2023-09-05 PROCEDURE — 700111 HCHG RX REV CODE 636 W/ 250 OVERRIDE (IP): Performed by: EMERGENCY MEDICINE

## 2023-09-05 PROCEDURE — 99285 EMERGENCY DEPT VISIT HI MDM: CPT

## 2023-09-05 PROCEDURE — 90715 TDAP VACCINE 7 YRS/> IM: CPT | Performed by: EMERGENCY MEDICINE

## 2023-09-05 PROCEDURE — 73502 X-RAY EXAM HIP UNI 2-3 VIEWS: CPT | Mod: RT

## 2023-09-05 PROCEDURE — 305308 HCHG STAPLER,SKIN,DISP.

## 2023-09-05 PROCEDURE — 73110 X-RAY EXAM OF WRIST: CPT | Mod: RT

## 2023-09-05 PROCEDURE — 70450 CT HEAD/BRAIN W/O DYE: CPT

## 2023-09-05 PROCEDURE — 304217 HCHG IRRIGATION SYSTEM

## 2023-09-05 PROCEDURE — 302874 HCHG BANDAGE ACE 2 OR 3""

## 2023-09-05 PROCEDURE — 303747 HCHG EXTRA SUTURE

## 2023-09-05 RX ORDER — LIDOCAINE HYDROCHLORIDE AND EPINEPHRINE 10; 10 MG/ML; UG/ML
10 INJECTION, SOLUTION INFILTRATION; PERINEURAL ONCE
Status: COMPLETED | OUTPATIENT
Start: 2023-09-05 | End: 2023-09-05

## 2023-09-05 RX ORDER — HYDROCODONE BITARTRATE AND ACETAMINOPHEN 5; 325 MG/1; MG/1
1 TABLET ORAL ONCE
Status: COMPLETED | OUTPATIENT
Start: 2023-09-05 | End: 2023-09-05

## 2023-09-05 RX ADMIN — LIDOCAINE HYDROCHLORIDE,EPINEPHRINE BITARTRATE 10 ML: 10; .01 INJECTION, SOLUTION INFILTRATION; PERINEURAL at 09:18

## 2023-09-05 RX ADMIN — CLOSTRIDIUM TETANI TOXOID ANTIGEN (FORMALDEHYDE INACTIVATED), CORYNEBACTERIUM DIPHTHERIAE TOXOID ANTIGEN (FORMALDEHYDE INACTIVATED), BORDETELLA PERTUSSIS TOXOID ANTIGEN (GLUTARALDEHYDE INACTIVATED), BORDETELLA PERTUSSIS FILAMENTOUS HEMAGGLUTININ ANTIGEN (FORMALDEHYDE INACTIVATED), BORDETELLA PERTUSSIS PERTACTIN ANTIGEN, AND BORDETELLA PERTUSSIS FIMBRIAE 2/3 ANTIGEN 0.5 ML: 5; 2; 2.5; 5; 3; 5 INJECTION, SUSPENSION INTRAMUSCULAR at 08:16

## 2023-09-05 RX ADMIN — HYDROCODONE BITARTRATE AND ACETAMINOPHEN 1 TABLET: 5; 325 TABLET ORAL at 12:36

## 2023-09-05 ASSESSMENT — PAIN DESCRIPTION - PAIN TYPE
TYPE: ACUTE PAIN

## 2023-09-05 ASSESSMENT — FIBROSIS 4 INDEX: FIB4 SCORE: 0.98

## 2023-09-05 NOTE — ED TRIAGE NOTES
Chief Complaint   Patient presents with    Head Injury     Pt had lacerated wound at her forehead and left temporal area after a metal cage fell on her  Then she fell down on the ground and sustained right hip/knee pain; both wrist pain  Not on blood thinners, no loss of consciousness  Pain: 6/10     Bleeding is controlled    Brought by EMS from her workplace with the above complaints    Blood glucose: 117 mg/dL    IV cannula gauge 20 left AC  No medication given en route  Alert and Oriented x 4; GCS: 15/15        Vitals:    09/05/23 0710   BP: (!) 165/82   Pulse: (!) 58   Resp: 18   Temp: 36.7 °C (98.1 °F)   SpO2: 99%

## 2023-09-05 NOTE — DISCHARGE INSTRUCTIONS
Return for worsening pain under head, face, neck or hip or other concerns.  Facial sutures come out in 7 days at occupational health.  Scalp staples on the scalp, on 10 days occupational.  You need a repeat x-ray of your left hand 7 to 10 days to rule out a fracture.  X-ray was negative however the type of injury Is concerning for a fracture that does not show up initially.  If her hip pain continues or worsens he may need a CT.  Take over-the-counter ibuprofen or Tylenol for pain.  Follow-up with your doctor for additional medical problems or complaints.

## 2023-09-05 NOTE — ED PROVIDER NOTES
ED Provider Note    CHIEF COMPLAINT  Chief Complaint   Patient presents with    Head Injury     Pt had lacerated wound at her forehead and left temporal area after a metal cage fell on her  Then she fell down on the ground and sustained right hip/knee pain; both wrist pain  Not on blood thinners, no loss of consciousness  Pain: 6/10           HPI/ROS  LIMITATION TO HISTORY   Select: : None  OUTSIDE HISTORIAN(S):  None    Amelia Thakkar is a 63 y.o. female who presents to the emergency department for evaluation of a head injury.  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  She sustained lacerations to her face and scalp.  She was knocked to the ground.  She complains of pain in her left hand, right wrist, right hip right knee, and head and neck.  Denies any numbness or tingling or weakness.  Denies any blood thinner use.  Tetanus is unknown.  Pain is moderate in severity.  She denies any other acute concerns or complaints.  Specifically no injury to her chest, abdomen, or spine.    PAST MEDICAL HISTORY   has a past medical history of Malignant neoplasm of skin (11/7/2012).    SURGICAL HISTORY   has a past surgical history that includes nasal septal reconstruction (08/2021); lumpectomy (Right, 1988); and other.    FAMILY HISTORY  Family History   Problem Relation Age of Onset    Diabetes Mother     Diabetes Father     Hypertension Father     Hyperlipidemia Father     Cancer Paternal Aunt         breast    Hypertension Paternal Aunt     Hypertension Paternal Uncle     Cancer Maternal Grandfather         skin    Cancer Other         uterine - cousin paternal    No Known Problems Daughter     Heart Disease Neg Hx     Stroke Neg Hx     Colon Cancer Neg Hx        SOCIAL HISTORY  Social History     Tobacco Use    Smoking status: Never    Smokeless tobacco: Never   Vaping Use    Vaping Use: Never used   Substance and Sexual Activity    Alcohol use: Yes     Comment: rare wine     "Drug use: Never    Sexual activity: Yes     Partners: Female       CURRENT MEDICATIONS  Home Medications       Reviewed by Patricia Mcnair R.N. (Registered Nurse) on 09/05/23 at 0717  Med List Status: Partial     Medication Last Dose Status   albuterol 108 (90 Base) MCG/ACT Aero Soln inhalation aerosol  Active   hydrOXYzine HCl (ATARAX) 25 MG Tab  Active   isotretinoin (ACCUTANE) 40 MG capsule  Active   triamcinolone acetonide (KENALOG) 0.1 % Ointment  Active                    ALLERGIES  Allergies   Allergen Reactions    Other Drug Unspecified     Congestion    Phenazopyridine Rash    Phenazopyridine Hcl Rash    Azo-Gantrisin Rash       PHYSICAL EXAM  VITAL SIGNS: BP (!) 168/80   Pulse (!) 56   Temp 36.7 °C (98.1 °F) (Temporal)   Resp 18   Ht 1.727 m (5' 8\")   Wt 77.1 kg (170 lb)   SpO2 98%   BMI 25.85 kg/m²    Constitutional: Well developed, Well nourished, No acute distress, Non-toxic appearance.   HENT: Normocephalic, large laceration on her forehead.  Large scalp laceration the left., Bilateral external ears normal, Oropharynx moist, No oral exudates, Nose normal.   Eyes: PERRL, EOMI, Conjunctiva normal, No discharge.   Neck: Midline tenderness no step-offs  Cardiovascular: Normal heart rate, Normal rhythm, No murmurs, No rubs, No gallops.   Thorax & Lungs: Normal breath sounds, No respiratory distress, No wheezing, No chest tenderness.   Abdomen: Bowel sounds normal, Soft, No tenderness  Skin: Warm, Dry, No erythema, No rash.   Back: No tenderness, No CVA tenderness.     Musculoskeletal: Good range of motion in all major joints.  Left hand has tenderness over the base of the thumb over the first metacarpal remainder of hand is unremarkable wrist, forearm elbow and shoulder are unremarkable.  Right wrist is diffusely tender without significant swelling is an abrasion on her right hand.  Tenderness on the right knee and right hip associate with discomfort with range of motion.  Normal neurovascular " exam.  Neurologic: Alert, No focal deficits noted.   Psychiatric: Affect normal      DIAGNOSTIC STUDIES / PROCEDURES    Laceration Repair Procedure Note    Indication: Lacerations     Procedure: The patient was placed in the appropriate position and anesthesia around the lacerations were obtained by infiltration using 1% Lidocaine with epinephrine. The area was then irrigated with normal saline, explored with no foreign bodies discovered, and draped in a sterile fashion. The laceration was closed in two layers. The subcutaneous layer was closed with 5-0 Vicryl using interrupted sutures. The skin was closed with \staples.    A second laceration was closed with 5-0 Ethilon using interrupted sutures. The wound area was then dressed with bacitracin.      Total repaired wound length: 19 cm.     Other Items: 16 sutures facial laceration.  3 deep sutures on the scalp and 17 staples    The patient tolerated the procedure well.    Complications: None        RADIOLOGY  I have independently interpreted the diagnostic imaging associated with this visit and am waiting the final reading from the radiologist.   My preliminary interpretation is as follows: I reviewed the head CT I do not see much.  Reviewed the left hand CT had nausea fracture.  Radiologist interpretation:   DX-WRIST-COMPLETE 3+ RIGHT   Final Result      No radiographic evidence of acute traumatic injury.      DX-KNEE COMPLETE 4+ RIGHT   Final Result      1.  No radiographic evidence of acute traumatic injury.   2.  Mild degenerative changes.      DX-HIP-COMPLETE - UNILATERAL 2+ RIGHT   Final Result      1.  Osteopenia without evidence of displaced fracture.   2.  Mild degenerative changes of the bilateral hip joints.      DX-HAND 3+ LEFT   Final Result      1.  No radiographic evidence of acute traumatic injury. If symptoms persist, follow-up radiographs can be obtained in 7-10 days.   2.  Severe degenerative changes of the basal joints of the thumb.       CT-CSPINE WITHOUT PLUS RECONS   Final Result      1.  No acute traumatic injury of the cervical spine.   2.  Multilevel disc and facet joint degenerative changes.      CT-HEAD W/O   Final Result      1.  No acute intracranial abnormality.   2.  Left scalp laceration.               COURSE & MEDICAL DECISION MAKING    ED Observation Status?     INITIAL ASSESSMENT, COURSE AND PLAN  Care Narrative:   Patient presents with fairly significant injury of a very heavy cage falling on her head.  Multiple large head hematomas and contusions and lacerations.  Stat head CT ordered.  Ordered tetanus shot lidocaine and preparing to close her lacerations.    Patient's multiple extremity injuries will be x-rayed.  She appears to have no injury of chest or abdomen.      Head and spinal CTs are negative.  The patient has laceration to obtain close multiple extremity injuries are imaged.  Concerned about a navicular fracture and occult thumb fracture.  The patient is significant tenderness and swelling the base of the thumb.  The x-ray is unremarkable for degenerative disease.  Because of her pain swelling and tenderness I cannot exclude occult fracture therefore patient placed in a thumb spica splint.    Further extremity injuries are unremarkable.  She has hip pain but she is able to ambulate without any difficulty and I think is likely a contusion.  Her x-ray is negative.  Since she is able to ambulate well I do not think she requires a CT at this time.  If she has increasing pain, swelling or difficulty walking should get a CT.  She does not want a CT now she was to go home.  She is counseled to follow-up with occupational health and to return for pain or other concerns peer questions were answered she is agreeable to plan she is discharged in good condition.        DISPOSITION AND DISCUSSIONS      Escalation of care considered, and ultimately not performed:blood analysis and diagnostic imaging    [facility] -  [place] [id number] [facility] [place]  [address]  [place] [address]  [phone]  Schedule an appointment as soon as possible for a visit in 2 days          FINAL DIAGNOSIS  1. Closed head injury, initial encounter    2. Laceration of scalp, initial encounter    3. Facial laceration, initial encounter    4. Injury of left hand, initial encounter    5. Contusion of right hip, initial encounter    6. Injury of right wrist, initial encounter    7. Acute pain of right knee    8. Contusion of right knee, initial encounter           Electronically signed by: Sheldon Kent M.D., 9/5/2023 7:54 AM    Addendum to clarify laceration length.  Her facial laceration was 8 cm, scalp laceration was 10 cm.  Scalp laceration was closed with 4 subcutaneous Vicryl sutures and onofre.    Sheldon Kent M.D.  8:42 AM on 9/20/2023

## 2023-09-05 NOTE — ED NOTES
Pt given d/c instructions and f/u info with verbal understanding.  VSS at discharge.  PIV d/c'd with tip intact.  Pt ambulatory from the ED w/ steady gait.  All belongings in possession on discharge.  Pt escorted to the lobby by RN.

## 2023-09-05 NOTE — LETTER
"  FORM C-4:  EMPLOYEE’S CLAIM FOR COMPENSATION/ REPORT OF INITIAL TREATMENT  EMPLOYEE’S CLAIM - PROVIDE ALL INFORMATION REQUESTED   First Name Amelia Last Name Bijan Birthdate 1960  Sex female Claim Number   Home Address [address]   [facility]             [place] [address]                                   Age  63 y.o. Height  1.727 m (5' 8\") Weight  77.1 kg (170 lb) N  [id number]   Mailing Address [address]  [facility]              [place] [address] Telephone  [phone] (home)  Primary Language Spoken   Insurer  *** Third Party   REBECCA CLAIMS [employer] Employee's Occupation (Job Title) When Injury or Occupational Disease Occurred  [employer]    Employer's Name [employer] Telephone [phone]    Employer Address [address] [facility] [[address]] Zip [address]   Date of Injury  9/5/2023       Hour of Injury  5:30 AM Date Employer Notified  9/5/2023 Last Day of Work after Injury or Occupational Disease  9/5/2023 Supervisor to Whom Injury Reported  Damon Verdin   Address or Location of Accident (if applicable) Work [1]   What were you doing at the time of accident? (if applicable) checking to see why a wheel was bent    How did this injury or occupational disease occur? Be specific and answer in detail. Use additional sheet if necessary)  The rack fell on me   If you believe that you have an occupational disease, when did you first have knowledge of the disability and it relationship to your employment? n/a Witnesses to the Accident  none   Nature of Injury or Occupational Disease  Workers' Compensation Part(s) of Body Injured or Affected  Skull, Knee (R), Wrist (R) and Hand (R)    I CERTIFY THAT THE ABOVE IS TRUE AND CORRECT TO THE BEST OF MY KNOWLEDGE AND THAT I HAVE PROVIDED THIS INFORMATION IN ORDER TO OBTAIN THE BENEFITS OF [place] CHAPTER 617 OF [place]).  I " HEREBY AUTHORIZE ANY PHYSICIAN, CHIROPRACTOR, SURGEON, PRACTITIONER, OR OTHER PERSON, ANY HOSPITAL, INCLUDING [facility] OR [facility], ANY MEDICAL SERVICE ORGANIZATION, ANY INSURANCE COMPANY, OR OTHER INSTITUTION OR ORGANIZATION TO RELEASE TO EACH OTHER, ANY MEDICAL OR OTHER INFORMATION, INCLUDING BENEFITS PAID OR PAYABLE, PERTINENT TO THIS INJURY OR DISEASE, EXCEPT INFORMATION RELATIVE TO DIAGNOSIS, TREATMENT AND/OR COUNSELING FOR AIDS, PSYCHOLOGICAL CONDITIONS, ALCOHOL OR CONTROLLED SUBSTANCES, FOR WHICH I MUST GIVE SPECIFIC AUTHORIZATION.  A PHOTOSTAT OF THIS AUTHORIZATION SHALL BE AS VALID AS THE ORIGINAL.  Date                                      Place                                                                             Employee’s Signature   THIS REPORT MUST BE COMPLETED AND MAILED WITHIN 3 WORKING DAYS OF TREATMENT   Place [facility], EMERGENCY DEPT                       Name of Facility [facility]   Date  9/5/2023 Diagnosis  No diagnosis found. Is there evidence the injured employee was under the influence of alcohol and/or another controlled substance at the time of accident?   Hour  12:09 PM Description of Injury or Disease   No   Treatment  CT of the head and neck to evaluate for intracranial injury, cervical spine fracture.  Multiple extremity injuries are noted and x-rays will be ordered and these will be splinted appropriately.  Lacerations were cleaned and closed and tetanus will be updated.  Have you advised the patient to remain off work five days or more?         No   X-Ray Findings    If Yes   From Date    To Date      From information given by the employee, together with medical evidence, can you directly connect this injury or occupational disease as job incurred? Yes If No, is employee capable of: Full Duty  No Modified Duty  Yes   Is additional medical care by a physician indicated? Yes If Modified Duty, Specify any  "Limitations / Restrictions   Light duty until cleared by occupational health   Do you know of any previous injury or disease contributing to this condition or occupational disease? No    Date 9/5/2023 Print Doctor’s Name Sheldon Kent certify the employer’s copy of this form was mailed on:   Address [address] [place] [address]  [place] [id number] [phone] INSURER’S USE ONLY   Provider’s Tax ID Number   Telephone Dept: [phone]    Doctor’s Signature sandra-SHELDON Morataya M.D. Degree        Form C-4 (rev.10/07)                                                                         BRIEF DESCRIPTION OF RIGHTS AND BENEFITS  (Pursuant to [place]RS 616C.050)    Notice of Injury or Occupational Disease (Incident Report Form C-1): If an injury or occupational disease (OD) arises out of and in the course of employment, you must provide written notice to your employer as soon as practicable, but no later than 7 days after the accident or OD. Your employer shall maintain a sufficient supply of the required forms.    Claim for Compensation (Form C-4): If medical treatment is sought, the form C-4 is available at the place of initial treatment. A completed \"Claim for Compensation\" (Form C-4) must be filed within 90 days after an accident or OD. The treating physician or chiropractor must, within 3 working days after treatment, complete and mail to the employer, the employer's insurer and third-party , the Claim for Compensation.    Medical Treatment: If you require medical treatment for your on-the-job injury or OD, you may be required to select a physician or chiropractor from a list provided by your workers’ compensation insurer, if it has contracted with an Organization for Managed Care (MCO) or Preferred Provider Organization (PPO) or providers of health care. If your employer has not entered into a contract with an MCO or PPO, you may select a physician or chiropractor from the Panel of Physicians and " Chiropractors. Any medical costs related to your industrial injury or OD will be paid by your insurer.    Temporary Total Disability (TTD): If your doctor has certified that you are unable to work for a period of at least 5 consecutive days, or 5 cumulative days in a 20-day period, or places restrictions on you that your employer does not accommodate, you may be entitled to TTD compensation.    Temporary Partial Disability (TPD): If the wage you receive upon reemployment is less than the compensation for TTD to which you are entitled, the insurer may be required to pay you TPD compensation to make up the difference. TPD can only be paid for a maximum of 24 months.    Permanent Partial Disability (PPD): When your medical condition is stable and there is an indication of a PPD as a result of your injury or OD, within 30 days, your insurer must arrange for an evaluation by a rating physician or chiropractor to determine the degree of your PPD. The amount of your PPD award depends on the date of injury, the results of the PPD evaluation, your age and wage.    Permanent Total Disability (PTD): If you are medically certified by a treating physician or chiropractor as permanently and totally disabled and have been granted a PTD status by your insurer, you are entitled to receive monthly benefits not to exceed 66 2/3% of your average monthly wage. The amount of your PTD payments is subject to reduction if you previously received a lump-sum PPD award.    Vocational Rehabilitation Services: You may be eligible for vocational rehabilitation services if you are unable to return to the job due to a permanent physical impairment or permanent restrictions as a result of your injury or occupational disease.    Transportation and Per Favian Reimbursement: You may be eligible for travel expenses and per favian associated with medical treatment.    Reopening: You may be able to reopen your claim if your condition worsens after claim  closure.     Appeal Process: If you disagree with a written determination issued by the insurer or the insurer does not respond to your request, you may appeal to the Department of Administration, , by following the instructions contained in your determination letter. You must appeal the determination within 70 days from the date of the determination letter at [address], [address], [place], [place] [address], or [address] [facility] [address], [address], [place], [place] [address]. If you disagree with the  decision, you may appeal to the Department of Administration, . You must file your appeal within 30 days from the date of the  decision letter at [address], [address], [place], [place] [address], or [address] [facility] [address], [facility] [address], [place], [place] [address]. If you disagree with a decision of an , you may file a petition for judicial review with the District Court. You must do so within 30 days of the Appeal Officer’s decision. You may be represented by an  at your own expense or you may contact the [facility] for possible representation.    [place]  for Injured Workers (NAIW): If you disagree with a  decision, you may request that NAIW represent you without charge at an  Hearing. For information regarding denial of benefits, you may contact the [facility] at: [address], [address], [place] [address], [phone], or [address] [facility] [address], [address], [place] [address], [phone]    To File a Complaint with the Division: If you wish to file a complaint with the  of the Division of Industrial Relations (DIR),  please contact the Workers’ Compensation Section, [address] [facility], [facility], [place], [place] [address], telephone [phone], or [address] [facility] [address], [place], [place] [address], telephone [phone].    For assistance with Workers’  Compensation Issues: You may contact the [facility] Office for Consumer Health Assistance, [facility] [facility], [facility] [address], [place] [address], Toll Free 1-891.523.1064, Web site: http://[facility].[place].gov/Programs/ANA[place]DA E-mail: ananda@[facility].[place].gov  D-2 (rev. 10/20)              __________________________________________________________________                                    _________________            Employee Name / Signature                                                                                                                            Date

## 2023-09-05 NOTE — ED NOTES
Pt resting on gurney, updated on POC. VSS, NAD, call light within reach. Gurney in lowest and locked position. Denies any needs, thanked or patience.

## 2023-09-05 NOTE — ED NOTES
Splint applied to Pts left hand. CMS intact prior and after splint application. Pt educated on splint use and care. Pt understood all instructions with no questions at this time.

## 2023-09-05 NOTE — ED NOTES
Bedside report from Patricia COLLADO. Patient changed into gown, VSS, updated on POC. Call light in hand, bed in lowest position and locked.

## 2023-09-05 NOTE — LETTER
"  FORM C-4:  EMPLOYEE’S CLAIM FOR COMPENSATION/ REPORT OF INITIAL TREATMENT  EMPLOYEE’S CLAIM - PROVIDE ALL INFORMATION REQUESTED   First Name Amelia Last Name Bijan Birthdate 1960  Sex female Claim Number   Home Address [address]   [facility]             [place] [address]                                   Age  63 y.o. Height  1.727 m (5' 8\") Weight  77.1 kg (170 lb) N  [id number]   Mailing Address [address]  [facility]              [place] [address] Telephone  [phone] (home)  Primary Language Spoken  english   Insurer  *** Third Party   REBECCA CLAIMS [employer] Employee's Occupation (Job Title) When Injury or Occupational Disease Occurred  [employer]quez Montero    Employer's Name [employer] Telephone [phone]    Employer Address [address] [facility] [address]] [place] [address]   Date of Injury  9/5/2023       Hour of Injury  5:30 AM Date Employer Notified  9/5/2023 Last Day of Work after Injury or Occupational Disease  9/5/2023 Supervisor to Whom Injury Reported  Damon Verdin   Address or Location of Accident (if applicable) Work [1]   What were you doing at the time of accident? (if applicable) checking to see why a wheel was bent    How did this injury or occupational disease occur? Be specific and answer in detail. Use additional sheet if necessary)  The rack fell on me   If you believe that you have an occupational disease, when did you first have knowledge of the disability and it relationship to your employment? n/a Witnesses to the Accident  none   Nature of Injury or Occupational Disease  Workers' Compensation Part(s) of Body Injured or Affected  Skull, Knee (R), Wrist (R) and Hand (R)    I CERTIFY THAT THE ABOVE IS TRUE AND CORRECT TO THE BEST OF MY KNOWLEDGE AND THAT I HAVE PROVIDED THIS INFORMATION IN ORDER TO OBTAIN THE BENEFITS OF [place], [place]IVE OR CHAPTER 617 OF " [place]).  I HEREBY AUTHORIZE ANY PHYSICIAN, CHIROPRACTOR, SURGEON, PRACTITIONER, OR OTHER PERSON, ANY HOSPITAL, INCLUDING [facility] OR [facility], ANY MEDICAL SERVICE ORGANIZATION, ANY INSURANCE COMPANY, OR OTHER INSTITUTION OR ORGANIZATION TO RELEASE TO EACH OTHER, ANY MEDICAL OR OTHER INFORMATION, INCLUDING BENEFITS PAID OR PAYABLE, PERTINENT TO THIS INJURY OR DISEASE, EXCEPT INFORMATION RELATIVE TO DIAGNOSIS, TREATMENT AND/OR COUNSELING FOR AIDS, PSYCHOLOGICAL CONDITIONS, ALCOHOL OR CONTROLLED SUBSTANCES, FOR WHICH I MUST GIVE SPECIFIC AUTHORIZATION.  A PHOTOSTAT OF THIS AUTHORIZATION SHALL BE AS VALID AS THE ORIGINAL.  Date    09/05/2023                                  Place     [facility]                                  Employee’s Signature   THIS REPORT MUST BE COMPLETED AND MAILED WITHIN 3 WORKING DAYS OF TREATMENT   Place [facility], EMERGENCY DEPT                       Name of Facility [facility]   Date  9/5/2023 Diagnosis  No diagnosis found. Is there evidence the injured employee was under the influence of alcohol and/or another controlled substance at the time of accident?   Hour  12:34 PM Description of Injury or Disease   No   Treatment  CT of the head and neck to evaluate for intracranial injury, cervical spine fracture.  Multiple extremity injuries are noted and x-rays will be ordered and these will be splinted appropriately.  Lacerations were cleaned and closed and tetanus will be updated.  Have you advised the patient to remain off work five days or more?         No   X-Ray Findings    If Yes   From Date    To Date      From information given by the employee, together with medical evidence, can you directly connect this injury or occupational disease as job incurred? Yes If No, is employee capable of: Full Duty  No Modified Duty  Yes   Is additional medical care by a physician indicated? Yes If Modified Duty, Specify any Limitations /  "Restrictions   Light duty until cleared by occupational health   Do you know of any previous injury or disease contributing to this condition or occupational disease? No    Date 9/5/2023 Print Doctor’s Name Sheldon Kent certify the employer’s copy of this form was mailed on:   Address [address] [place] [address]  [place] [id number] [phone] INSURER’S USE ONLY   Provider’s Tax ID Number   Telephone Dept: [phone]    Doctor’s Signature e-SHELDON Morataya M.D. Degree  MD      Form C-4 (rev.10/07)                                                                         BRIEF DESCRIPTION OF RIGHTS AND BENEFITS  ([place] to [place] 616C.050)    Notice of Injury or Occupational Disease (Incident Report Form C-1): If an injury or occupational disease (OD) arises out of and in the course of employment, you must provide written notice to your employer as soon as practicable, but no later than 7 days after the accident or OD. Your employer shall maintain a sufficient supply of the required forms.    Claim for Compensation (Form C-4): If medical treatment is sought, the form C-4 is available at the place of initial treatment. A completed \"Claim for Compensation\" (Form C-4) must be filed within 90 days after an accident or OD. The treating physician or chiropractor must, within 3 working days after treatment, complete and mail to the employer, the employer's insurer and third-party , the Claim for Compensation.    Medical Treatment: If you require medical treatment for your on-the-job injury or OD, you may be required to select a physician or chiropractor from a list provided by your workers’ compensation insurer, if it has contracted with an Organization for Managed Care (MCO) or Preferred Provider Organization (PPO) or providers of health care. If your employer has not entered into a contract with an MCO or PPO, you may select a physician or chiropractor from the Panel of Physicians and Chiropractors. Any " medical costs related to your industrial injury or OD will be paid by your insurer.    Temporary Total Disability (TTD): If your doctor has certified that you are unable to work for a period of at least 5 consecutive days, or 5 cumulative days in a 20-day period, or places restrictions on you that your employer does not accommodate, you may be entitled to TTD compensation.    Temporary Partial Disability (TPD): If the wage you receive upon reemployment is less than the compensation for TTD to which you are entitled, the insurer may be required to pay you TPD compensation to make up the difference. TPD can only be paid for a maximum of 24 months.    Permanent Partial Disability (PPD): When your medical condition is stable and there is an indication of a PPD as a result of your injury or OD, within 30 days, your insurer must arrange for an evaluation by a rating physician or chiropractor to determine the degree of your PPD. The amount of your PPD award depends on the date of injury, the results of the PPD evaluation, your age and wage.    Permanent Total Disability (PTD): If you are medically certified by a treating physician or chiropractor as permanently and totally disabled and have been granted a PTD status by your insurer, you are entitled to receive monthly benefits not to exceed 66 2/3% of your average monthly wage. The amount of your PTD payments is subject to reduction if you previously received a lump-sum PPD award.    Vocational Rehabilitation Services: You may be eligible for vocational rehabilitation services if you are unable to return to the job due to a permanent physical impairment or permanent restrictions as a result of your injury or occupational disease.    Transportation and Per Favian Reimbursement: You may be eligible for travel expenses and per favian associated with medical treatment.    Reopening: You may be able to reopen your claim if your condition worsens after claim closure.     Appeal  Process: If you disagree with a written determination issued by the insurer or the insurer does not respond to your request, you may appeal to the Department of Administration, , by following the instructions contained in your determination letter. You must appeal the determination within 70 days from the date of the determination letter at [address], [address], [place], [place] [address], or [address] [facility] [address], [address], [place], [place] [address]. If you disagree with the  decision, you may appeal to the Department of Administration, . You must file your appeal within 30 days from the date of the  decision letter at [address], [address], [place], [place] [address], or [address] [facility] [address], [facility] [address], [place], [place] [address]. If you disagree with a decision of an , you may file a petition for judicial review with the District Court. You must do so within 30 days of the Appeal Officer’s decision. You may be represented by an  at your own expense or you may contact the [facility] for possible representation.    [place]  for Injured Workers ([place]IW): If you disagree with a  decision, you may request that NAIW represent you without charge at an  Hearing. For information regarding denial of benefits, you may contact the [facility] at: [address] [facility] [address], [address], [place] [address], [phone], or [address] [facility] [address], [address], [place] [address], [phone]    To File a Complaint with the Division: If you wish to file a complaint with the  of the Division of Industrial Relations (DIR),  please contact the Workers’ Compensation Section, [address] [facility], [facility] [address], [place], [place] [address], telephone ([phone]) [phone], or [address] [facility], [facility] [address], [place], [place] [address], telephone [phone].    For assistance with Workers’ Compensation  Issues: You may contact the [facility] Office for Consumer Health Assistance, [facility] [facility], [place] [address], [place] [address], Toll Free 1-653.368.3217, Web site: http://[facility].[place].gov/Programs/[place] E-mail: ananda@[facility].[place].gov  D-2 (rev. 10/20)              __________________________________________________________________                                    ____09/05/2023_______            Employee Name / Signature                                                                                                                            Date

## 2023-09-05 NOTE — ED NOTES
Bedside report given to the next shift RN for continuity of care and management.  Provided opportunity to asks questions.  Pt connected to monitor  All pt belongings at bedside

## 2023-09-06 ENCOUNTER — APPOINTMENT (OUTPATIENT)
Dept: URGENT CARE | Facility: PHYSICIAN GROUP | Age: 63
End: 2023-09-06
Payer: COMMERCIAL

## 2023-09-08 ENCOUNTER — OCCUPATIONAL MEDICINE (OUTPATIENT)
Dept: URGENT CARE | Facility: PHYSICIAN GROUP | Age: 63
End: 2023-09-08
Payer: COMMERCIAL

## 2023-09-08 VITALS
TEMPERATURE: 98 F | RESPIRATION RATE: 16 BRPM | SYSTOLIC BLOOD PRESSURE: 124 MMHG | OXYGEN SATURATION: 97 % | WEIGHT: 170 LBS | HEART RATE: 91 BPM | HEIGHT: 68 IN | DIASTOLIC BLOOD PRESSURE: 74 MMHG | BODY MASS INDEX: 25.76 KG/M2
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
## 2023-09-08 DIAGNOSIS — M25.531 RIGHT WRIST PAIN: ICD-10-CM

## 2023-09-08 DIAGNOSIS — S01.01XD LACERATION OF SCALP WITHOUT FOREIGN BODY, SUBSEQUENT ENCOUNTER: ICD-10-CM

## 2023-09-08 DIAGNOSIS — S69.92XD HAND INJURY, LEFT, SUBSEQUENT ENCOUNTER: ICD-10-CM

## 2023-09-08 DIAGNOSIS — M25.561 ACUTE PAIN OF RIGHT KNEE: ICD-10-CM

## 2023-09-08 DIAGNOSIS — S09.90XD TRAUMATIC INJURY OF HEAD, SUBSEQUENT ENCOUNTER: ICD-10-CM

## 2023-09-08 DIAGNOSIS — M25.551 RIGHT HIP PAIN: ICD-10-CM

## 2023-09-08 PROCEDURE — 3078F DIAST BP <80 MM HG: CPT

## 2023-09-08 PROCEDURE — 3074F SYST BP LT 130 MM HG: CPT

## 2023-09-08 PROCEDURE — 99214 OFFICE O/P EST MOD 30 MIN: CPT

## 2023-09-08 ASSESSMENT — FIBROSIS 4 INDEX: FIB4 SCORE: 0.98

## 2023-09-08 NOTE — LETTER
[address] - [place] [address]  Phone:  [phone] - Fax:  [phone]   [facility] Progress Report and Disability Certification  Date of Service: 9/8/2023   No Show:  No  Date / Time of Next Visit: 9/12/2023 @9:00am   Claim Information   Patient Name: Amelia Thakkar  Claim Number:     Employer: [employer]  Date of Injury: 9/5/2023     Insurer / TPA: [employer]  ID / SSN:     Occupation: [employer]  Diagnosis: Diagnoses of Work related injury, Hand injury, left, subsequent encounter, Right wrist pain, Traumatic injury of head, subsequent encounter, Right hip pain, Acute pain of right knee, and Laceration of scalp without foreign body, subsequent encounter were pertinent to this visit.    Medical Information   Related to Industrial Injury? Yes    Subjective Complaints:  DOI (copied from ED visit): presents to the emergency department for evaluation of a head injury.  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  She sustained lacerations to her face and scalp.  She was knocked to the ground.  She complains of pain in her left hand, right wrist, right hip right knee, and head and neck.  Denies any numbness or tingling or weakness.     She received wound repair - 16 sutures facial laceration.  3 deep sutures on the scalp and 17 staples   (The laceration was closed in two layers. The subcutaneous layer was closed with 5-0 Vicryl using interrupted sutures. The skin was closed with staples.  A second laceration was closed with 5-0 Ethilon using interrupted suture)    There was a Concerned about a navicular fracture and occult thumb fracture due to pain and swelling.  The patient is significant tenderness and swelling the base of the thumb.  The x-ray is unremarkable for degenerative disease.  Because of her pain swelling and tenderness I cannot exclude occult fracture  therefore patient placed in a thumb spica splint.  Can re-image in 7-10 days if Pain persists    FV #1 9/8  Pt is overall doing better.  She reports her hip and right knee are feeling better, she mostly has pain on standing up but once she is up she feels minimal pain.  Reports her lacerations have been healing well.  Reports that the pain her her left hand has improved, she is not feeling much pain in her thumb in the splint. She has had no headaches and is not taking any medications for pain other than a CBD gummy     Objective Findings: Gen: no acute distress, normal voice  Skin: dry, intact, moist mucosal membranes  Head: Atraumatic, normocephalic  Psych: normal affect, normal judgement, alert, awake  Musculoskeletal:   Her laceration to her forehead is looking good, no signs of infection.  Can be removed around 9/12  Scalp laceration with staples looks good, no signs of infection.  Can be removed around 9/15  Right hip has full ROM with minimal pain  Right knee, no bruising noted, TTP at patella but full ROM  Bruising to right anterior shoulder  Left hand-splint removed.  No snuffbox tenderness, no TTP to any point of the base of the thumb, she feels mostly stiff when moving it.  IF symptoms persist in thumb radiology recommends re-imaging around 9/12-9/15  Reports some neck stiffness, no midline tenderness -CT was negative   Pre-Existing Condition(s):     Assessment:   Initial Visit    Status: Additional Care Required  Permanent Disability:No    Plan:      Diagnostics:      Comments:       Disability Information   Status: Released to Restricted Duty    From:  9/8/2023  Through: 9/12/2023 Restrictions are: Temporary   Physical Restrictions   Sitting:    Standing:    Stooping:    Bending:      Squatting:    Walking:    Climbing:    Pushing:      Pulling:    Other:    Reaching Above Shoulder (L):   Reaching Above Shoulder (R):       Reaching Below Shoulder (L):    Reaching Below Shoulder (R):      Not to exceed  Weight Limits   Carrying(hrs):   Weight Limit(lb):   Lifting(hrs):   Weight  Limit(lb):     Comments: No use of left hand, must wear brace at all times at work.  No bending, stooping or squatting, no laying on the ground.  Keep wounds clean and dry.  Gentle back, hip, knee stretches and range of motion exercises    Repetitive Actions   Hands: i.e. Fine Manipulations from Grasping:     Feet: i.e. Operating Foot Controls:     Driving / Operate Machinery:     Health Care Provider’s Original or Electronic Signature  TARI Guevara Health Care Provider’s Original or Electronic Signature    Dave Mckeon DO MPH     Clinic Name / Location: [address]  [place] [address] Clinic Phone Number: Dept: [phone]   Appointment Time: 8:50 Am Visit Start Time: 8:51 AM   Check-In Time:  8:50 Am Visit Discharge Time:  9:43AM   Original-Treating Physician or Chiropractor    Page 2-Insurer/TPA    Page 3-Employer    Page 4-Employee

## 2023-09-08 NOTE — PROGRESS NOTES
Subjective:     Amelia Thakkar is a 63 y.o. female who presents for Follow-Up (On left hand re check )      DOI (copied from ED visit): presents to the emergency department for evaluation of a head injury.  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  She sustained lacerations to her face and scalp.  She was knocked to the ground.  She complains of pain in her left hand, right wrist, right hip right knee, and head and neck.  Denies any numbness or tingling or weakness.     She received wound repair - 16 sutures facial laceration.  3 deep sutures on the scalp and 17 staples   (The laceration was closed in two layers. The subcutaneous layer was closed with 5-0 Vicryl using interrupted sutures. The skin was closed with staples.  A second laceration was closed with 5-0 Ethilon using interrupted suture)    There was a Concerned about a navicular fracture and occult thumb fracture due to pain and swelling.  The patient is significant tenderness and swelling the base of the thumb.  The x-ray is unremarkable for degenerative disease.  Because of her pain swelling and tenderness I cannot exclude occult fracture therefore patient placed in a thumb spica splint.  Can re-image in 7-10 days if Pain persists    FV #1 9/8  Pt is overall doing better.  She reports her hip and right knee are feeling better, she mostly has pain on standing up but once she is up she feels minimal pain.  Reports her lacerations have been healing well.  Reports that the pain her her left hand has improved, she is not feeling much pain in her thumb in the splint. She has had no headaches and is not taking any medications for pain other than a CBD gummy      PMH:   No pertinent past medical history to this problem  MEDS:  Medications were reviewed in EMR  ALLERGIES:  Allergies were reviewed in EMR  FH:   No pertinent family history to this problem       Objective:     /74 (BP Location: Right arm, Patient  "Position: Sitting, BP Cuff Size: Adult)   Pulse 91   Temp 36.7 °C (98 °F) (Temporal)   Resp 16   Ht 1.727 m (5' 8\")   Wt 77.1 kg (170 lb)   SpO2 97%   BMI 25.85 kg/m²     Gen: no acute distress, normal voice  Skin: dry, intact, moist mucosal membranes  Head: Atraumatic, normocephalic  Psych: normal affect, normal judgement, alert, awake  Musculoskeletal:   Her laceration to her forehead is looking good, no signs of infection.  Can be removed around 9/12  Scalp laceration with staples looks good, no signs of infection.  Can be removed around 9/15  Right hip has full ROM with minimal pain  Right knee, no bruising noted, TTP at patella but full ROM  Bruising to right anterior shoulder  Left hand-splint removed.  No snuffbox tenderness, no TTP to any point of the base of the thumb, she feels mostly stiff when moving it.  IF symptoms persist in thumb radiology recommends re-imaging around 9/12-9/15  Reports some neck stiffness, no midline tenderness -CT was negative    Assessment/Plan:       1. Work related injury    2. Hand injury, left, subsequent encounter    3. Right wrist pain    4. Traumatic injury of head, subsequent encounter    5. Right hip pain    6. Acute pain of right knee    7. Laceration of scalp without foreign body, subsequent encounter    Released to Restricted Duty FROM 9/8/2023 TO 9/12/2023  No use of left hand, must wear brace at all times at work.  No bending, stooping or squatting, no laying on the ground.  Keep wounds clean and dry.  Gentle back, hip, knee stretches and range of motion exercises   SHe is placed in a thumb spica and instructed to wear at all times, can remove to shower.  If she is still having tenderness will re-image on next FU    Differential diagnosis, natural history, supportive care, and indications for immediate follow-up discussed.    Between 30-39 minutes was spent caring for this patient on the day of the encounter which included face-to-face time, discussing the " diagnosis, medical management, follow-up, emergency room precautions and completion of the chart. This does not include time spent on separately billable procedures/tests.

## 2023-09-12 ENCOUNTER — OCCUPATIONAL MEDICINE (OUTPATIENT)
Dept: URGENT CARE | Facility: PHYSICIAN GROUP | Age: 63
End: 2023-09-12
Payer: COMMERCIAL

## 2023-09-12 ENCOUNTER — APPOINTMENT (OUTPATIENT)
Dept: RADIOLOGY | Facility: IMAGING CENTER | Age: 63
End: 2023-09-12
Attending: NURSE PRACTITIONER
Payer: COMMERCIAL

## 2023-09-12 VITALS
BODY MASS INDEX: 25.76 KG/M2 | HEART RATE: 71 BPM | OXYGEN SATURATION: 94 % | WEIGHT: 170 LBS | HEIGHT: 68 IN | DIASTOLIC BLOOD PRESSURE: 64 MMHG | SYSTOLIC BLOOD PRESSURE: 108 MMHG | RESPIRATION RATE: 20 BRPM | TEMPERATURE: 97.5 F

## 2023-09-12 DIAGNOSIS — M25.561 ACUTE PAIN OF RIGHT KNEE: ICD-10-CM

## 2023-09-12 DIAGNOSIS — M25.531 RIGHT WRIST PAIN: ICD-10-CM

## 2023-09-12 DIAGNOSIS — S09.90XD TRAUMATIC INJURY OF HEAD, SUBSEQUENT ENCOUNTER: ICD-10-CM

## 2023-09-12 DIAGNOSIS — S69.92XD HAND INJURY, LEFT, SUBSEQUENT ENCOUNTER: ICD-10-CM

## 2023-09-12 DIAGNOSIS — M25.551 RIGHT HIP PAIN: ICD-10-CM

## 2023-09-12 DIAGNOSIS — S01.01XD LACERATION OF SCALP WITHOUT FOREIGN BODY, SUBSEQUENT ENCOUNTER: ICD-10-CM

## 2023-09-12 PROCEDURE — 3074F SYST BP LT 130 MM HG: CPT | Performed by: NURSE PRACTITIONER

## 2023-09-12 PROCEDURE — 3078F DIAST BP <80 MM HG: CPT | Performed by: NURSE PRACTITIONER

## 2023-09-12 PROCEDURE — 73130 X-RAY EXAM OF HAND: CPT | Mod: TC,LT | Performed by: PHYSICIAN ASSISTANT

## 2023-09-12 PROCEDURE — 99213 OFFICE O/P EST LOW 20 MIN: CPT | Performed by: NURSE PRACTITIONER

## 2023-09-12 ASSESSMENT — ENCOUNTER SYMPTOMS
TINGLING: 0
BLURRED VISION: 0
MYALGIAS: 1
DOUBLE VISION: 0
FOCAL WEAKNESS: 0
SENSORY CHANGE: 0

## 2023-09-12 ASSESSMENT — FIBROSIS 4 INDEX: FIB4 SCORE: 0.98

## 2023-09-12 NOTE — LETTER
[address] - [place] [address]  Phone:  [phone] - Fax:  [phone]   [facility] Progress Report and Disability Certification  Date of Service: 9/12/2023   No Show:  No  Date / Time of Next Visit: 9/15/2023   Claim Information   Patient Name: Amelia Thakkar  Claim Number:     Employer: [employer] Date of Injury: 9/5/2023     Insurer / TPA: [employer] ID / SSN:     Occupation: [employer] Diagnosis: Diagnoses of Hand injury, left, subsequent encounter, Traumatic injury of head, subsequent encounter, Laceration of scalp without foreign body, subsequent encounter, Right hip pain, Acute pain of right knee, and Right wrist pain were pertinent to this visit.    Medical Information   Related to Industrial Injury? Yes   Subjective Complaints:  DOI (copied from ED visit): presents to the emergency department for evaluation of a head injury.  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  She sustained lacerations to her face and scalp.  She was knocked to the ground.  She complains of pain in her left hand, right wrist, right hip right knee, and head and neck.  Denies any numbness or tingling or weakness.      She received wound repair - 16 sutures facial laceration.  3 deep sutures on the scalp and 17 staples   (The laceration was closed in two layers. The subcutaneous layer was closed with 5-0 Vicryl using interrupted sutures. The skin was closed with staples.  A second laceration was closed with 5-0 Ethilon using interrupted suture)     There was a Concerned about a navicular fracture and occult thumb fracture due to pain and swelling.  The patient is significant tenderness and swelling the base of the thumb.  The x-ray is unremarkable for degenerative disease.  Because of her pain swelling and tenderness I cannot exclude occult fracture therefore patient placed in a  thumb spica splint.  Can re-image in 7-10 days if Pain persists     FV #1 9/8  Pt is overall doing better.  She reports her hip and right knee are feeling better, she mostly has pain on standing up but once she is up she feels minimal pain.  Reports her lacerations have been healing well.  Reports that the pain her her left hand has improved, she is not feeling much pain in her thumb in the splint. She has had no headaches and is not taking any medications for pain other than a CBD gummy   F/U#2 patient is here for follow up on above injuries. She needs sutures out today however staples come out on Friday. She has mild hip pain, no fractures identified on ED visit. Head CT negative. She denies dizziness, nausea and states ambulating normally. Incisions clean and dry.    Objective Findings: Physical Exam  Constitutional:       Appearance: Normal appearance.   Cardiovascular:      Rate and Rhythm: Normal rate and regular rhythm.      Heart sounds: No murmur heard.  Pulmonary:      Effort: Pulmonary effort is normal.      Breath sounds: Normal breath sounds.   Musculoskeletal:         General: Normal range of motion.      Comments: Splint to left wrist. Base of thumb  but likely arthritic component.   Skin:     General: Skin is warm and dry.      Capillary Refill: Capillary refill takes less than 2 seconds.      Findings: Bruising present.      Comments: Sutures on forehead removed. Incision is clean and dry. Bruising noted to right and left knee.    Neurological:      General: No focal deficit present.      Mental Status: She is alert and oriented to person, place, and time.        Pre-Existing Condition(s):     Assessment:   Condition Improved    Status: Additional Care Required  Permanent Disability:No    Plan:      Diagnostics:      Comments:       Disability Information   Status: Released to Restricted Duty    From:  9/12/2023  Through: 9/15/2023 Restrictions are: Temporary   Physical Restrictions    Sitting:    Standing:    Stoopin hrs/day Bending:      Squattin hrs/day Walking:    Climbin hrs/day Pushing:      Pulling:    Other:    Reaching Above Shoulder (L):   Reaching Above Shoulder (R):       Reaching Below Shoulder (L):    Reaching Below Shoulder (R):      Not to exceed Weight Limits   Carrying(hrs):   Weight Limit(lb): < or = to 10 pounds Lifting(hrs):   Weight  Limit(lb): < or = to 10 pounds   Comments: No crawling on floor.    Repetitive Actions   Hands: i.e. Fine Manipulations from Graspin hrs/day  Comments:hand   Feet: i.e. Operating Foot Controls:     Driving / Operate Machinery:     Health Care Provider’s Original or Electronic Signature  [place]el Weston A.P.N. Health Care Provider’s Original or Electronic Signature    Dave Mckeon DO MPH     Clinic Name / Location: [address]  [place] [address] Clinic Phone Number: Dept: [phone]   Appointment Time: 8:50 Am Visit Start Time: 8:51 AM   Check-In Time:  8:48 Am Visit Discharge Time: 10:06 AM   Original-Treating Physician or Chiropractor    Page 2-Insurer/TPA    Page 3-Employer    Page 4-Employee

## 2023-09-12 NOTE — LETTER
[address] - [place] [address]  Phone:  [phone] - Fax:  [phone]   [facility] Progress Report and Disability Certification  Date of Service: 9/12/2023   No Show:  No  Date / Time of Next Visit: 9/15/2023   Claim Information   Patient Name: Amelia Thakkar  Claim Number:     Employer: [employer] *** Date of Injury: 9/5/2023     Insurer / TPA: [employer] *** ID / SSN:     Occupation: [employer] *** Diagnosis: Diagnoses of Hand injury, left, subsequent encounter, Traumatic injury of head, subsequent encounter, Laceration of scalp without foreign body, subsequent encounter, Right hip pain, Acute pain of right knee, and Right wrist pain were pertinent to this visit.    Medical Information   Related to Industrial Injury? Yes ***   Subjective Complaints:  DOI (copied from ED visit): presents to the emergency department for evaluation of a head injury.  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  She sustained lacerations to her face and scalp.  She was knocked to the ground.  She complains of pain in her left hand, right wrist, right hip right knee, and head and neck.  Denies any numbness or tingling or weakness.      She received wound repair - 16 sutures facial laceration.  3 deep sutures on the scalp and 17 staples   (The laceration was closed in two layers. The subcutaneous layer was closed with 5-0 Vicryl using interrupted sutures. The skin was closed with staples.  A second laceration was closed with 5-0 Ethilon using interrupted suture)     There was a Concerned about a navicular fracture and occult thumb fracture due to pain and swelling.  The patient is significant tenderness and swelling the base of the thumb.  The x-ray is unremarkable for degenerative disease.  Because of her pain swelling and tenderness I cannot exclude occult fracture therefore  patient placed in a thumb spica splint.  Can re-image in 7-10 days if Pain persists     FV #1 9/8  Pt is overall doing better.  She reports her hip and right knee are feeling better, she mostly has pain on standing up but once she is up she feels minimal pain.  Reports her lacerations have been healing well.  Reports that the pain her her left hand has improved, she is not feeling much pain in her thumb in the splint. She has had no headaches and is not taking any medications for pain other than a CBD gummy   F/U#2 patient is here for follow up on above injuries. She needs sutures out today however staples come out on Friday. She has mild hip pain, no fractures identified on ED visit. Head CT negative. She denies dizziness, nausea and states ambulating normally. Incisions clean and dry.    Objective Findings: Physical Exam  Constitutional:       Appearance: Normal appearance.   Cardiovascular:      Rate and Rhythm: Normal rate and regular rhythm.      Heart sounds: No murmur heard.  Pulmonary:      Effort: Pulmonary effort is normal.      Breath sounds: Normal breath sounds.   Musculoskeletal:         General: Normal range of motion.      Comments: Splint to left wrist. Base of thumb  but likely arthritic component.   Skin:     General: Skin is warm and dry.      Capillary Refill: Capillary refill takes less than 2 seconds.      Findings: Bruising present.      Comments: Sutures on forehead removed. Incision is clean and dry. Bruising noted to right and left knee.    Neurological:      General: No focal deficit present.      Mental Status: She is alert and oriented to person, place, and time.        Pre-Existing Condition(s):     Assessment:   Condition Improved    Status: Additional Care Required  Permanent Disability:No    Plan:      Diagnostics:      Comments:       Disability Information   Status: Released to Restricted Duty    From:  9/12/2023  Through: 9/15/2023 Restrictions are: Temporary    Physical Restrictions   Sitting:    Standing:    Stoopin hrs/day Bending:      Squattin hrs/day Walking:    Climbin hrs/day Pushing:      Pulling:    Other:    Reaching Above Shoulder (L):   Reaching Above Shoulder (R):       Reaching Below Shoulder (L):    Reaching Below Shoulder (R):      Not to exceed Weight Limits   Carrying(hrs):   Weight Limit(lb): < or = to 10 pounds Lifting(hrs):   Weight  Limit(lb): < or = to 10 pounds   Comments: No crawling on floor.    Repetitive Actions   Hands: i.e. Fine Manipulations from Graspin hrs/day  Comments:hand   Feet: i.e. Operating Foot Controls:     Driving / Operate Machinery:     Health Care Provider’s Original or Electronic Signature  CINDY YouPRupalN. Health Care Provider’s Original or Electronic Signature    Dave Mckeon DO MPH     Clinic Name / Location: [address]  [place] [address] Clinic Phone Number: Dept: [phone]   Appointment Time: 8:50 Am Visit Start Time: 8:51 AM   Check-In Time:  8:48 Am Visit Discharge Time:  ***   Original-Treating Physician or Chiropractor    Page 2-Insurer/TPA    Page 3-Employer    Page 4-Employee

## 2023-09-12 NOTE — PROGRESS NOTES
Subjective     Kourtney Thakkar is a 63 y.o. female who presents with Work-Related Injury (DOI 09/05/2023 left wrist,17 stitches,face)            HPIDOI (copied from ED visit): presents to the emergency department for evaluation of a head injury.  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  She sustained lacerations to her face and scalp.  She was knocked to the ground.  She complains of pain in her left hand, right wrist, right hip right knee, and head and neck.  Denies any numbness or tingling or weakness.      She received wound repair - 16 sutures facial laceration.  3 deep sutures on the scalp and 17 staples   (The laceration was closed in two layers. The subcutaneous layer was closed with 5-0 Vicryl using interrupted sutures. The skin was closed with staples.  A second laceration was closed with 5-0 Ethilon using interrupted suture)     There was a Concerned about a navicular fracture and occult thumb fracture due to pain and swelling.  The patient is significant tenderness and swelling the base of the thumb.  The x-ray is unremarkable for degenerative disease.  Because of her pain swelling and tenderness I cannot exclude occult fracture therefore patient placed in a thumb spica splint.  Can re-image in 7-10 days if Pain persists     FV #1 9/8  Pt is overall doing better.  She reports her hip and right knee are feeling better, she mostly has pain on standing up but once she is up she feels minimal pain.  Reports her lacerations have been healing well.  Reports that the pain her her left hand has improved, she is not feeling much pain in her thumb in the splint. She has had no headaches and is not taking any medications for pain other than a CBD gummy   F/U#2 patient is here for follow up on above injuries. She needs sutures out today however staples come out on Friday. She has mild hip pain, no fractures identified on ED visit. Head CT negative. She denies  dizziness, nausea and states ambulating normally. Incisions clean and dry.   Other drug, Phenazopyridine, Phenazopyridine hcl, and Azo-gantrisin  Current Outpatient Medications on File Prior to Visit   Medication Sig Dispense Refill    albuterol 108 (90 Base) MCG/ACT Aero Soln inhalation aerosol Inhale 2 Puffs every four hours as needed for Shortness of Breath. 8.5 g 1    hydrOXYzine HCl (ATARAX) 25 MG Tab  (Patient not taking: Reported on 9/12/2023)      triamcinolone acetonide (KENALOG) 0.1 % Ointment  (Patient not taking: Reported on 9/12/2023)       No current facility-administered medications on file prior to visit.     Social History     Socioeconomic History    Marital status: Single     Spouse name: Not on file    Number of children: Not on file    Years of education: Not on file    Highest education level: Associate degree: academic program   Occupational History    Not on file   Tobacco Use    Smoking status: Never    Smokeless tobacco: Never   Vaping Use    Vaping Use: Never used   Substance and Sexual Activity    Alcohol use: Yes     Comment: rare wine    Drug use: Never    Sexual activity: Yes     Partners: Female   Other Topics Concern    Not on file   Social History Narrative    Not on file     Social Determinants of Health     Financial Resource Strain: Low Risk  (12/15/2021)    Overall Financial Resource Strain (CARDIA)     Difficulty of Paying Living Expenses: Not hard at all   Food Insecurity: No Food Insecurity (12/15/2021)    Hunger Vital Sign     Worried About Running Out of Food in the Last Year: Never true     Ran Out of Food in the Last Year: Never true   Transportation Needs: No Transportation Needs (12/15/2021)    PRAPARE - Transportation     Lack of Transportation (Medical): No     Lack of Transportation (Non-Medical): No   Physical Activity: Inactive (12/15/2021)    Exercise Vital Sign     Days of Exercise per Week: 0 days     Minutes of Exercise per Session: 0 min   Stress: Stress  "Concern Present (12/15/2021)    [employer] - Occupational Stress Questionnaire     Feeling of Stress : To some extent   Social Connections: Socially Isolated (12/15/2021)    Social Connection and Isolation Panel [NHANES]     Frequency of Communication with Friends and Family: Twice a week     Frequency of Social Gatherings with Friends and Family: Never     Attends Spiritism Services: Never     Active Member of Clubs or Organizations: No     Attends Club or Organization Meetings: Never     Marital Status: Living with partner   Intimate Partner Violence: Not on file   Housing Stability: High Risk (12/15/2021)    Housing Stability Vital Sign     Unable to Pay for Housing in the Last Year: No     Number of Places Lived in the Last Year: 3     Unstable Housing in the Last Year: No     Breast Cancer-related family history is not on file.    Review of Systems   Eyes:  Negative for blurred vision and double vision.   Musculoskeletal:  Positive for joint pain and myalgias.   Skin:         Laceration with staples to left parietal area. Laceration with sutures to left forehead.   Neurological:  Negative for tingling, sensory change and focal weakness.              Objective     /64 (BP Location: Right arm, Patient Position: Sitting, BP Cuff Size: Adult)   Pulse 71   Temp 36.4 °C (97.5 °F) (Temporal)   Resp 20   Ht 1.727 m (5' 8\")   Wt 77.1 kg (170 lb)   SpO2 94%   BMI 25.85 kg/m²      Physical Exam  Constitutional:       Appearance: Normal appearance.   Cardiovascular:      Rate and Rhythm: Normal rate and regular rhythm.      Heart sounds: No murmur heard.  Pulmonary:      Effort: Pulmonary effort is normal.      Breath sounds: Normal breath sounds.   Musculoskeletal:         General: Normal range of motion.      Comments: Splint to left wrist. Base of thumb  but likely arthritic component.   Skin:     General: Skin is warm and dry.      Capillary Refill: Capillary " refill takes less than 2 seconds.      Findings: Bruising present.      Comments: Sutures on forehead removed. Incision is clean and dry. Bruising noted to right and left knee.    Neurological:      General: No focal deficit present.      Mental Status: She is alert and oriented to person, place, and time.                             Assessment & Plan        1. Hand injury, left, subsequent encounter  DX-HAND 3+ LEFT      2. Traumatic injury of head, subsequent encounter        3. Laceration of scalp without foreign body, subsequent encounter        4. Right hip pain        5. Acute pain of right knee        6. Right wrist pain          Restrictions per d39  Follow up Friday for staple removal  X-ray with no fracture.  Differential diagnosis, natural history, supportive care, and indications for immediate follow-up were discussed.

## 2023-09-15 ENCOUNTER — OCCUPATIONAL MEDICINE (OUTPATIENT)
Dept: URGENT CARE | Facility: PHYSICIAN GROUP | Age: 63
End: 2023-09-15
Payer: COMMERCIAL

## 2023-09-15 VITALS
HEIGHT: 68 IN | OXYGEN SATURATION: 98 % | DIASTOLIC BLOOD PRESSURE: 60 MMHG | WEIGHT: 171 LBS | RESPIRATION RATE: 18 BRPM | SYSTOLIC BLOOD PRESSURE: 102 MMHG | BODY MASS INDEX: 25.91 KG/M2 | HEART RATE: 70 BPM | TEMPERATURE: 98.1 F

## 2023-09-15 DIAGNOSIS — S09.90XD TRAUMATIC INJURY OF HEAD, SUBSEQUENT ENCOUNTER: ICD-10-CM

## 2023-09-15 DIAGNOSIS — S69.92XD HAND INJURY, LEFT, SUBSEQUENT ENCOUNTER: ICD-10-CM

## 2023-09-15 DIAGNOSIS — M25.561 ACUTE PAIN OF RIGHT KNEE: ICD-10-CM

## 2023-09-15 DIAGNOSIS — Z48.02 ENCOUNTER FOR STAPLE REMOVAL: ICD-10-CM

## 2023-09-15 DIAGNOSIS — M25.551 RIGHT HIP PAIN: ICD-10-CM

## 2023-09-15 PROCEDURE — 3078F DIAST BP <80 MM HG: CPT | Performed by: NURSE PRACTITIONER

## 2023-09-15 PROCEDURE — 3074F SYST BP LT 130 MM HG: CPT | Performed by: NURSE PRACTITIONER

## 2023-09-15 PROCEDURE — 99214 OFFICE O/P EST MOD 30 MIN: CPT | Performed by: NURSE PRACTITIONER

## 2023-09-15 ASSESSMENT — FIBROSIS 4 INDEX: FIB4 SCORE: 0.98

## 2023-09-15 NOTE — LETTER
[address] - [place] [address]  Phone:  [phone] - Fax:  [phone]   [facility] Progress Report and Disability Certification  Date of Service: 9/15/2023   No Show:  No  Date / Time of Next Visit: 9/19/2023   Claim Information   Patient Name: Amelia Thakkar  Claim Number:     Employer: [employer]  Date of Injury: 9/5/2023     Insurer / TPA: [employer]  ID / SSN:     Occupation: [employer]  Diagnosis: Diagnoses of Traumatic injury of head, subsequent encounter, Encounter for staple removal, Hand injury, left, subsequent encounter, Acute pain of right knee, and Right hip pain were pertinent to this visit.    Medical Information   Related to Industrial Injury? Yes    Subjective Complaints:  (Copied from previous visit): DOI 9/5/23  Presents to the emergency department for evaluation of a head injury.  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  She sustained lacerations to her face and scalp.  She was knocked to the ground.  She complains of pain in her left hand, right wrist, right hip right knee, and head and neck.  Denies any numbness or tingling or weakness.      She received wound repair - 16 sutures facial laceration.  3 deep sutures on the scalp and 17 staples   (The laceration was closed in two layers. The subcutaneous layer was closed with 5-0 Vicryl using interrupted sutures. The skin was closed with staples.  A second laceration was closed with 5-0 Ethilon using interrupted suture)     There was a Concerned about a navicular fracture and occult thumb fracture due to pain and swelling.  The patient is significant tenderness and swelling the base of the thumb.  The x-ray is unremarkable for degenerative disease.  Because of her pain swelling and tenderness I cannot exclude occult fracture therefore patient placed in a thumb spica splint.  Can re-image in  7-10 days if Pain persists     FV #1 9/8  Pt is overall doing better.  She reports her hip and right knee are feeling better, she mostly has pain on standing up but once she is up she feels minimal pain.  Reports her lacerations have been healing well.  Reports that the pain her her left hand has improved, she is not feeling much pain in her thumb in the splint. She has had no headaches and is not taking any medications for pain other than a CBD gummy   F/U#2 patient is here for follow up on above injuries. She needs sutures out today however staples come out on Friday. She has mild hip pain, no fractures identified on ED visit. Head CT negative. She denies dizziness, nausea and states ambulating normally. Incisions clean and dry.     9/15/23, follow up #3: Today patient presents for follow up for staple removal. Notes the area has been healing well but is tender. She is performing basic wound care. Denies headaches, dizziness, nausea, syncope. Overall, she reports improvement in her symptoms. She still endorses some neck soreness, right knee soreness, left wrist soreness and sacral soreness. She is taking CBD for pain relief. She has been tolerating work restrictions well.    Objective Findings: A/Ox4. NAD.  No cervical spine tenderness.  Faint facial ecchymosis noted to forehead, previous laceration appears well-healed.  There is a laceration to her scalp with 17 staples intact, no signs of secondary infectious process.  Bilateral upper and lower extremity strength 5/5, neurovascular intact.  Left wrist: Normal appearance, nontender, full range of motion.  Right knee: Normal appearance, tenderness to medial aspect, full range of motion.  Gait stable.   Pre-Existing Condition(s): Denies    Assessment:   Condition Improved    Status: Additional Care Required  Permanent Disability:No    Plan: Medication  Comments:OTC Tylenol, gentle stretching, ice and heat therapy, work restrictions, return to clinic in 4 days for  reevaluation.    Diagnostics: X-ray    Comments:       Disability Information   Status: Released to Restricted Duty    From:  9/15/2023  Through: 2023 Restrictions are: Temporary   Physical Restrictions   Sitting:    Standing:  < or = to 1 hr/day Stoopin hrs/day Bending:      Squattin hrs/day Walking:  < or = to 1 hr/day Climbin hrs/day Pushing:  < or = to 1 hr/day   Pulling:  < or = to 1 hr/day Other:    Reaching Above Shoulder (L):   Reaching Above Shoulder (R):       Reaching Below Shoulder (L):    Reaching Below Shoulder (R):      Not to exceed Weight Limits   Carrying(hrs):   Weight Limit(lb): < or = to 10 pounds Lifting(hrs):   Weight  Limit(lb): < or = to 10 pounds   Comments:      Repetitive Actions   Hands: i.e. Fine Manipulations from Grasping: < or = to 1 hr/day   Feet: i.e. Operating Foot Controls: 0 hrs/day   Driving / Operate Machinery: 0 hrs/day   Health Care Provider’s Original or Electronic Signature  TARI Tomlni Health Care Provider’s Original or Electronic Signature    Dave Mckeon DO MPH     Clinic Name / Location: [address]  [place] [address] Clinic Phone Number: Dept: [phone]   Appointment Time: 9:00 Am Visit Start Time: 9:13 AM   Check-In Time:  8:54 Am Visit Discharge Time:     Original-Treating Physician or Chiropractor    Page 2-Insurer/TPA    Page 3-Employer    Page 4-Employee

## 2023-09-15 NOTE — PROGRESS NOTES
Chief Complaint   Patient presents with    Work-Related Injury     DOI 9/5/23, Staple removal, 17 staples, Pt states staples are currently painful.        HISTORY OF PRESENT ILLNESS: Patient is a pleasant 63 y.o. female who presents to urgent care today with a work comp follow up. (Copied from previous visit): DOI 9/5/23  Presents to the emergency department for evaluation of a head injury.  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  She sustained lacerations to her face and scalp.  She was knocked to the ground.  She complains of pain in her left hand, right wrist, right hip right knee, and head and neck.  Denies any numbness or tingling or weakness.       She received wound repair - 16 sutures facial laceration.  3 deep sutures on the scalp and 17 staples    (The laceration was closed in two layers. The subcutaneous layer was closed with 5-0 Vicryl using interrupted sutures. The skin was closed with staples.  A second laceration was closed with 5-0 Ethilon using interrupted suture)       There was a Concerned about a navicular fracture and occult thumb fracture due to pain and swelling.  The patient is significant tenderness and swelling the base of the thumb.  The x-ray is unremarkable for degenerative disease.  Because of her pain swelling and tenderness I cannot exclude occult fracture therefore patient placed in a thumb spica splint.  Can re-image in 7-10 days if Pain persists       FV #1 9/8   Pt is overall doing better.  She reports her hip and right knee are feeling better, she mostly has pain on standing up but once she is up she feels minimal pain.  Reports her lacerations have been healing well.  Reports that the pain her her left hand has improved, she is not feeling much pain in her thumb in the splint. She has had no headaches and is not taking any medications for pain other than a CBD gummy    F/U#2 patient is here for follow up on above injuries. She  "needs sutures out today however staples come out on Friday. She has mild hip pain, no fractures identified on ED visit. Head CT negative. She denies dizziness, nausea and states ambulating normally. Incisions clean and dry.     9/15/23, follow up #3: Today patient presents for follow up for staple removal. Notes the area has been healing well but is tender. She is performing basic wound care. Denies headaches, dizziness, nausea, syncope. Overall, she reports improvement in her symptoms. She still endorses some neck soreness, right knee soreness, left wrist soreness and sacral soreness. She is taking CBD for pain relief. She has been tolerating work restrictions well.       PMH: No pertinent past medical history to this problem  MEDS: Medications were reviewed in Epic  ALLERGIES: Allergies were reviewed in Epic  FH: No pertinent family history to this problem      ROS:  Review of Systems   Constitutional: Negative for fever, chills, weight loss, malaise, and fatigue.   HENT: Negative for ear pain, nosebleeds, congestion, sore throat and neck pain.    Eyes: Negative for vision changes.   Neuro: Negative for headache, sensory changes, weakness, seizure, LOC.   Cardiovascular: Negative for chest pain, palpitations, orthopnea and leg swelling.   Respiratory: Negative for cough, sputum production, shortness of breath and wheezing.   Gastrointestinal: Negative for abdominal pain, nausea, vomiting or diarrhea.   Genitourinary: Negative for dysuria, urgency and frequency.  Musculoskeletal: Negative for falls, neck pain, back pain, joint pain, myalgias.   Skin: Negative for rash, diaphoresis.     Exam:  /60 (BP Location: Right arm, Patient Position: Sitting, BP Cuff Size: Adult)   Pulse 70   Temp 36.7 °C (98.1 °F) (Temporal)   Resp 18   Ht 1.727 m (5' 8\")   Wt 77.6 kg (171 lb)   SpO2 98%   General: well-nourished, well-developed female in NAD  Head: normocephalic  Eyes: PERRLA, no conjunctival injection, acuity " grossly intact, lids normal.  Ears: normal shape and symmetry, no tenderness, no discharge. External canals are without any significant edema or erythema. Tympanic membranes are without any inflammation, no effusion. Gross auditory acuity is intact.  Nose: symmetrical without tenderness, no discharge.  Mouth/Throat: reasonable hygiene, no erythema, exudates or tonsillar enlargement.  Neck: no masses, range of motion within normal limits, no tracheal deviation. No obvious thyroid enlargement.   Lymph: no cervical adenopathy. No supraclavicular adenopathy.   Neuro: alert and oriented. Cranial nerves 1-12 grossly intact. No sensory deficit.   Cardiovascular: regular rate and rhythm. No edema.  Pulmonary: no distress. Chest is symmetrical with respiration, no wheezes, crackles, or rhonchi.   Musculoskeletal: no clubbing, appropriate muscle tone, gait is stable.  Skin: warm, no clubbing, no cyanosis, no rashes.   Psych: appropriate mood, affect, judgement.     A/Ox4. NAD.  No cervical spine tenderness.  Faint facial ecchymosis noted to forehead, previous laceration appears well-healed.  There is a laceration to her scalp with 17 staples intact, no signs of secondary infectious process.  Bilateral upper and lower extremity strength 5/5, neurovascular intact.  Left wrist: Normal appearance, nontender, full range of motion.  Right knee: Normal appearance, tenderness to medial aspect, full range of motion.  Gait stable.      Assessment/Plan:  1. Traumatic injury of head, subsequent encounter        2. Encounter for staple removal        3. Hand injury, left, subsequent encounter        4. Acute pain of right knee        5. Right hip pain            Staples removed in clinic, patient tolerated well.  OTC Tylenol, gentle stretching, ice and heat therapy, work restrictions, return to clinic in 4 days for reevaluation.)  Supportive care, differential diagnoses, and indications for immediate follow-up discussed with patient.    Pathogenesis of diagnosis discussed including typical length and natural progression.   Instructed to return to clinic or nearest emergency department sooner for any change in condition, further concerns, or worsening of symptoms.  Patient states understanding of the plan of care and discharge instructions.          Please note that this dictation was created using voice recognition software. I have made every reasonable attempt to correct obvious errors, but I expect that there are errors of grammar and possibly content that I did not discover before finalizing the note. Previous clinic visit encounter reviewed and considered in medical decision making today. I spent a total of 30 minutes with record review, exam, communication with the patient, and documentation of this encounter.          VANESSA Tomlin.

## 2023-09-19 ENCOUNTER — OCCUPATIONAL MEDICINE (OUTPATIENT)
Dept: URGENT CARE | Facility: PHYSICIAN GROUP | Age: 63
End: 2023-09-19
Payer: COMMERCIAL

## 2023-09-19 VITALS
HEIGHT: 68 IN | RESPIRATION RATE: 20 BRPM | TEMPERATURE: 98.1 F | BODY MASS INDEX: 25.76 KG/M2 | SYSTOLIC BLOOD PRESSURE: 112 MMHG | HEART RATE: 71 BPM | DIASTOLIC BLOOD PRESSURE: 64 MMHG | WEIGHT: 170 LBS | OXYGEN SATURATION: 97 %

## 2023-09-19 DIAGNOSIS — S80.01XD CONTUSION OF RIGHT KNEE, SUBSEQUENT ENCOUNTER: ICD-10-CM

## 2023-09-19 DIAGNOSIS — S70.01XD CONTUSION OF RIGHT HIP, SUBSEQUENT ENCOUNTER: ICD-10-CM

## 2023-09-19 DIAGNOSIS — S39.012D STRAIN OF SACRUM, SUBSEQUENT ENCOUNTER: ICD-10-CM

## 2023-09-19 DIAGNOSIS — S01.01XD LACERATION OF SCALP, SUBSEQUENT ENCOUNTER: ICD-10-CM

## 2023-09-19 PROCEDURE — 3074F SYST BP LT 130 MM HG: CPT | Performed by: FAMILY MEDICINE

## 2023-09-19 PROCEDURE — 99214 OFFICE O/P EST MOD 30 MIN: CPT | Performed by: FAMILY MEDICINE

## 2023-09-19 PROCEDURE — 3078F DIAST BP <80 MM HG: CPT | Performed by: FAMILY MEDICINE

## 2023-09-19 ASSESSMENT — FIBROSIS 4 INDEX: FIB4 SCORE: 0.98

## 2023-09-19 ASSESSMENT — ENCOUNTER SYMPTOMS
FOCAL WEAKNESS: 0
SENSORY CHANGE: 0

## 2023-09-19 NOTE — LETTER
[facility]  [address] [place] [address]. [address] - [place] [address]  Phone:  [phone] - Fax:  [phone]   [facility] Progress Report and Disability Certification  Date of Service: 9/19/2023   No Show:  No  Date / Time of Next Visit: 10/10/2023 [facility]   Claim Information   Patient Name: Amelia Thakkar  Claim Number:     Employer: [employer] Date of Injury: 9/5/2023     Insurer / TPA: [employer] ID / SSN:     Occupation: [employer] Diagnosis: Diagnoses of Strain of sacrum, subsequent encounter, Laceration of scalp, subsequent encounter, Contusion of right knee, subsequent encounter, and Contusion of right hip, subsequent encounter were pertinent to this visit.    Medical Information   Related to Industrial Injury? Yes   Subjective Complaints:  DOI: 9/5/2023  F/u for multiple injuries due to metal cage falling on her with subsequent ground level fall. Overall she feel 75% improved.  1. Low back and sacral pain persists. Worse with standing from seated positions. No myelopathy symptoms.   2. Right hip and knee injury improving. Mild medial pain. No locking.   3. Scalp laceration healed. No bleeding. No redness or drainage. No headache.     She has not been back to work.    Objective Findings: HEENT: well healing scalp laceration without evidence of dehiscence or infection  Back: tender sacral region without deformity, flexion limited to 90 degrees with pain  Neuro. Bilateral lower extremity strength and sensory intact.Negative straight leg raise.       Pre-Existing Condition(s):     Assessment:   Condition Improved    Status: Additional Care Required  Permanent Disability:No    Plan: PTTransfer Care  Comments:advance work restriction, initiate PT, transfer care to occupational medicine    Diagnostics:      Comments:       Disability Information   Status: Released to Restricted Duty    From:  9/19/2023  Through: 10/10/2023 Restrictions  are: Temporary   Physical Restrictions   Sitting:    Standing:    Stooping:  < or = to 1 hr/day Bending:  < or = to 1 hr/day   Squatting:  < or = to 1 hr/day Walking:    Climbing:    Pushing:      Pulling:    Other:    Reaching Above Shoulder (L):   Reaching Above Shoulder (R):       Reaching Below Shoulder (L):    Reaching Below Shoulder (R):      Not to exceed Weight Limits   Carrying(hrs):   Weight Limit(lb):   Comments:20 Lifting(hrs):   Weight  Limit(lb):   Comments:20   Comments:      Repetitive Actions   Hands: i.e. Fine Manipulations from Grasping:     Feet: i.e. Operating Foot Controls:     Driving / Operate Machinery:     Health Care Provider’s Original or Electronic Signature  Karan Mendoza M.D. Health Care Provider’s Original or Electronic Signature    Dave Mckeon DO MPH     Clinic Name / Location: [address]  [place] [address] Clinic Phone Number: Dept: [phone]   Appointment Time: 9:00 Am Visit Start Time: 9:05 AM   Check-In Time:  8:57 Am Visit Discharge Time: 9:34 AM   Original-Treating Physician or Chiropractor    Page 2-Insurer/TPA    Page 3-Employer    Page 4-Employee

## 2023-09-19 NOTE — PROGRESS NOTES
"Subjective     Kourtney Thakkar is a 63 y.o. female who presents with Work-Related Injury (DOI 0905/2023 right knee)      DOI: 9/5/2023  F/u for multiple injuries due to metal cage falling on her with subsequent ground level fall. Overall she feel 75% improved.  1. Low back and sacral pain persists. Worse with standing from seated positions. No myelopathy symptoms.   2. Right hip and knee injury improving. Mild medial pain. No locking.   3. Scalp laceration healed. No bleeding. No redness or drainage. No headache.     She has not been back to work.      HPI    Review of Systems   Neurological:  Negative for sensory change and focal weakness.              Objective     /64 (BP Location: Right arm, Patient Position: Sitting, BP Cuff Size: Adult)   Pulse 71   Temp 36.7 °C (98.1 °F) (Temporal)   Resp 20   Ht 1.727 m (5' 8\")   Wt 77.1 kg (170 lb)   SpO2 97%   BMI 25.85 kg/m²      Physical Exam  Constitutional:       Appearance: Normal appearance.   Skin:     General: Skin is warm and dry.   Neurological:      General: No focal deficit present.      Mental Status: She is alert and oriented to person, place, and time.      Gait: Gait normal.         HEENT: well healing scalp laceration without evidence of dehiscence or infection  Back: tender sacral region without deformity, flexion limited to 90 degrees with pain  Neuro. Bilateral lower extremity strength and sensory intact.Negative straight leg raise.                       Assessment & Plan   Hip x-ray 9/5/2023 reviewed    Emergency department visit 9/5/2023 reviewed  Urgent care visits 9/8/2023, 9/12/2023, and 9/15/2023 reviewed     1. Strain of sacrum, subsequent encounter    - Referral to Physical Therapy  - Referral to Occupational Medicine    2. Laceration of scalp, subsequent encounter    - Referral to Occupational Medicine    3. Contusion of right knee, subsequent encounter    - Referral to Occupational Medicine    4. Contusion of right hip, " subsequent encounter    - Referral to Occupational Medicine    Advanced work restrictions on D39  Referral for physical therapy and transfer of care to occupational medicine.    Follow-up in 3 weeks with occupational medicine.  Here sooner if symptoms resolved completely.

## 2023-09-26 ENCOUNTER — OCCUPATIONAL MEDICINE (OUTPATIENT)
Dept: OCCUPATIONAL MEDICINE | Facility: CLINIC | Age: 63
End: 2023-09-26
Payer: COMMERCIAL

## 2023-09-26 VITALS
SYSTOLIC BLOOD PRESSURE: 120 MMHG | BODY MASS INDEX: 26.98 KG/M2 | WEIGHT: 178 LBS | HEIGHT: 68 IN | TEMPERATURE: 97.3 F | OXYGEN SATURATION: 97 % | HEART RATE: 63 BPM | RESPIRATION RATE: 16 BRPM | DIASTOLIC BLOOD PRESSURE: 68 MMHG

## 2023-09-26 DIAGNOSIS — S86.911D KNEE STRAIN, RIGHT, SUBSEQUENT ENCOUNTER: ICD-10-CM

## 2023-09-26 DIAGNOSIS — S76.011D HIP STRAIN, RIGHT, SUBSEQUENT ENCOUNTER: ICD-10-CM

## 2023-09-26 DIAGNOSIS — S01.01XD LACERATION OF SCALP, SUBSEQUENT ENCOUNTER: ICD-10-CM

## 2023-09-26 DIAGNOSIS — S39.012D STRAIN OF LUMBAR REGION, SUBSEQUENT ENCOUNTER: ICD-10-CM

## 2023-09-26 DIAGNOSIS — S66.912D STRAIN OF LEFT WRIST, SUBSEQUENT ENCOUNTER: ICD-10-CM

## 2023-09-26 DIAGNOSIS — S46.911D STRAIN OF RIGHT SHOULDER, SUBSEQUENT ENCOUNTER: ICD-10-CM

## 2023-09-26 PROCEDURE — 3074F SYST BP LT 130 MM HG: CPT | Performed by: PREVENTIVE MEDICINE

## 2023-09-26 PROCEDURE — 99203 OFFICE O/P NEW LOW 30 MIN: CPT | Performed by: PREVENTIVE MEDICINE

## 2023-09-26 PROCEDURE — 3078F DIAST BP <80 MM HG: CPT | Performed by: PREVENTIVE MEDICINE

## 2023-09-26 ASSESSMENT — FIBROSIS 4 INDEX: FIB4 SCORE: 0.98

## 2023-09-26 NOTE — PROGRESS NOTES
"Subjective:     Amelia Thakkar is a 63 y.o. female who presents for Other (New  doi: 09/05/2023 right shoulder/hip/knee/wrist and left hand  feeling the same rm 16)      DOI: 9/5/2023: 63-year-old injured worker presents with multi body part injury.  JOELLE: Multiple injuries due to metal cage falling on her with subsequent ground level fall.  Injured body parts to include head, right shoulder, left wrist, right wrist, right hip, low back, right knee and right ankle.  Patient's overall she is had gradual improvement but does continue persistent pain in the right shoulder, low back, right knee and left wrist.  She is scheduled start physical therapy tomorrow.    ROS: All systems were reviewed on intake form, form was reviewed and signed. See scanned documents in media. Pertinent positives and negatives included in HPI.    PMH: No pertinent past medical history to this problem  MEDS: Medications were reviewed in Epic  ALLERGIES:   Allergies   Allergen Reactions    Other Drug Unspecified     Congestion    Phenazopyridine Rash    Phenazopyridine Hcl Rash    Azo-Gantrisin Rash     SOCHX: Works as an associate at [employer]  FH: No pertinent family history to this problem       Objective:     /68 (BP Location: Right arm, Patient Position: Sitting, BP Cuff Size: Adult)   Pulse 63   Temp 36.3 °C (97.3 °F) (Temporal)   Resp 16   Ht 1.727 m (5' 8\")   Wt 80.7 kg (178 lb)   SpO2 97%   BMI 27.06 kg/m²     Constitutional: Patient is in no acute distress. Appears well-developed and well-nourished.   HENT: Normocephalic and atraumatic. EOM are normal. No scleral icterus.   Cardiovascular: Normal rate.    Pulmonary/Chest: Effort normal. No respiratory distress.   Neurological: Patient is alert and oriented to person, place, and time.   Skin: Skin is warm and dry.   Psychiatric: Normal mood and affect. Behavior is normal.     Right shoulder: No gross deformity.  Full range of motion with minimal discomfort.  " Strength grossly intact.  Lumbar: No gross deformity.  Full range of motion with mild discomfort.  Right knee: No gross deformity.  Area of pain over the medial knee.  Able to squat partly with pain.  Slight antalgic gait.  Left wrist: Pain over the ulnar wrist.  Full range of motion.    Assessment/Plan:       1. Strain of right shoulder, subsequent encounter    2. Strain of lumbar region, subsequent encounter    3. Hip strain, right, subsequent encounter    4. Knee strain, right, subsequent encounter    5. Strain of left wrist, subsequent encounter    6. Laceration of scalp, subsequent encounter    Released to Restricted Duty FROM 9/26/2023 TO 10/16/2023  Limit to less than 20 pounds lift/push/pull.  Begin physical therapy as scheduled OTC medications as needed  OTC muscle creams/ointments as needed  Okay to use heat/ice as needed  Restricted duty  Follow-up 2 to 3 weeks    Differential diagnosis, natural history, supportive care, and indications for immediate follow-up discussed.    Approximately 35 minutes were spent in reviewing notes, preparing for visit, obtaining history, exam and evaluation, patient counseling/education and post visit documentation/orders.

## 2023-09-26 NOTE — LETTER
[address],   [address] [address]  Phone:  [phone] - Fax:  [phone]   Occupational Health [facility] Progress Report and Disability Certification  Date of Service: 9/26/2023   No Show:  No  Date / Time of Next Visit: 10/16/2023@7:30 AM   Claim Information   Patient Name: Amelia Thakkar  Claim Number:     Employer:   [employer] Date of Injury:      Insurer / TPA:    ID / SSN:     Occupation:  MAINTENANCE Diagnosis: Diagnoses of Strain of right shoulder, subsequent encounter, Strain of lumbar region, subsequent encounter, Hip strain, right, subsequent encounter, Knee strain, right, subsequent encounter, Strain of left wrist, subsequent encounter, and Laceration of scalp, subsequent encounter were pertinent to this visit.    Medical Information   Related to Industrial Injury? 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  JOELLE: Multiple injuries due to metal cage falling on her with subsequent ground level fall.  Injured body parts to include head, right shoulder, left wrist, right wrist, right hip, low back, right knee and right ankle.  Patient's overall she is had gradual improvement but does continue persistent pain in the right shoulder, low back, right knee and left wrist.  She is scheduled start physical therapy tomorrow.   Objective Findings: Right shoulder: No gross deformity.  Full range of motion with minimal discomfort.  Strength grossly intact.  Lumbar: No gross deformity.  Full range of motion with mild discomfort.  Right knee: No gross deformity.  Area of pain over the medial knee.  Able to squat partly with pain.  Slight antalgic gait.  Left wrist: Pain over the ulnar wrist.  Full range of motion.   Pre-Existing Condition(s):     Assessment:   Condition Same    Status: Additional Care Required  Permanent Disability:No    Plan:      Diagnostics:      Comments:  Begin  physical therapy as scheduled OTC medications as needed  OTC muscle creams/ointments as needed  Okay to use heat/ice as needed  Restricted duty  Follow-up 2 to 3 weeks    Disability Information   Status: Released to Restricted Duty    From:  9/26/2023  Through: 10/16/2023 Restrictions are: Temporary   Physical Restrictions   Sitting:    Standing:    Stooping:  < or = to 1 hr/day Bending:  < or = to 1 hr/day   Squatting:  < or = to 1 hr/day Walking:    Climbing:    Pushing:      Pulling:    Other:    Reaching Above Shoulder (L):   Reaching Above Shoulder (R):       Reaching Below Shoulder (L):    Reaching Below Shoulder (R):      Not to exceed Weight Limits   Carrying(hrs):   Weight Limit(lb):   Lifting(hrs):   Weight  Limit(lb):     Comments: Limit to less than 20 pounds lift/push/pull.    Repetitive Actions   Hands: i.e. Fine Manipulations from Grasping:     Feet: i.e. Operating Foot Controls:     Driving / Operate Machinery:     Health Care Provider’s Original or Electronic Signature  Dave Mckeon D.O. Health Care Provider’s Original or Electronic Signature    Dave Mckeon DO [facility]     Clinic Name / Location: [address],   [address]  [place] [address] Clinic Phone Number: Dept: [phone]   Appointment Time: 1:00 Pm Visit Start Time:    Check-In Time:   Visit Discharge Time:  12:18 PM   Original-Treating Physician or Chiropractor    Page 2-Insurer/TPA    Page 3-Employer    Page 4-Employee

## 2023-09-27 ENCOUNTER — APPOINTMENT (OUTPATIENT)
Dept: PHYSICAL THERAPY | Facility: REHABILITATION | Age: 63
End: 2023-09-27
Attending: FAMILY MEDICINE
Payer: COMMERCIAL

## 2023-09-27 NOTE — OP THERAPY EVALUATION
Outpatient Physical Therapy  INITIAL EVALUATION    [facility] [address]  [address]  [place] [address]  Phone:  [phone]  Fax:  [phone]    Date of Evaluation: 09/27/2023    Patient: Kourtney Thakkar  YOB: 1960  MRN: [id number]     Referring Provider: Karan Mendoza M.D.  [address]  [address]  [place] [address]   Referring Diagnosis Strain of sacrum, subsequent encounter [S39.012D]     Time Calculation                 Chief Complaint: No chief complaint on file.    Visit Diagnoses     ICD-10-CM   1. Sprain and strain of sacrum, subsequent encounter  S33.8XXD       Date of onset of impairment: No data found    Subjective:   History of Present Illness:     Date of onset:  9/5/2023    Mechanism of injury:  Pt is a 62 yo female presenting to PT with c/o LBP following work injury, in which metal cage fell on her 9/5/2023. Pt reports     Aggs:   Easers:   Irritability:        Past Medical History:   Diagnosis Date    Malignant neoplasm of skin 11/7/2012    left neck     Past Surgical History:   Procedure Laterality Date    NASAL SEPTAL RECONSTRUCTION  08/2021    LUMPECTOMY Right 1988    fibocystic    OTHER      bilateral vein stripping vs ablation?     Social History     Tobacco Use    Smoking status: Never    Smokeless tobacco: Never   Substance Use Topics    Alcohol use: Yes     Comment: rare wine     Family and Occupational History     Socioeconomic History    Marital status: Single     Spouse name: Not on file    Number of children: Not on file    Years of education: Not on file    Highest education level: Associate degree: academic program   Occupational History    Not on file       Objective      Therapeutic Exercises (CPT 30120):       Therapeutic Exercise Summary: HEP:         Time-based treatments/modalities:           Assessment, Response and Plan:   Assessment details:  Pt is a 62 yo cis-female presenting to PT w/ clinical findings suggestive  of ***. Pertinent clinical findings include ***. Impairments are associated w/ ***. The patient would benefit from skilled PT to address ***. The patient states she understands and agrees with the plan of care. HEP w/ handout was reviewed and provided to the pt.   Barriers to therapy:  None  Prognosis: good    Goals:   Short Term Goals:   1. Pt will be independent with HEP 3-5x per week for improving strength, ROM and decreasing pain  2. Pt will demo ability to initiate core contraction mechanics for improved stability  3. Pt will report ability to tolerate sitting/standing x XXX with pain XX or less   4. Pt will demo proper squat mechanics in order to  objects from the ground  Short term goal time span:  2-4 weeks      Long Term Goals:    1. Pt will demo lumbar ROM WNL in order to perform ADLs with pain XXX or less  2. Pt will demo global hip strength XX or better for improved stabilty and decreased pain  3. Pt will report ability to tolerate sitting/standing x XXX with pain XX or less  4. Pt will reports ability to XXX with pain XXX or less   Long term goal time span:  4-6 weeks    Plan:   Therapy options:  Physical therapy treatment to continue      Functional Assessment Used        Referring provider co-signature:  I have reviewed this plan of care and my co-signature certifies the need for services.    Certification Period: 09/27/2023 to  12/26/23    Physician Signature: ________________________________ Date: ______________

## 2023-10-03 ENCOUNTER — APPOINTMENT (OUTPATIENT)
Dept: PHYSICAL THERAPY | Facility: REHABILITATION | Age: 63
End: 2023-10-03
Attending: FAMILY MEDICINE
Payer: COMMERCIAL

## 2023-10-06 ENCOUNTER — APPOINTMENT (OUTPATIENT)
Dept: PHYSICAL THERAPY | Facility: REHABILITATION | Age: 63
End: 2023-10-06
Attending: FAMILY MEDICINE
Payer: COMMERCIAL

## 2023-10-10 ENCOUNTER — APPOINTMENT (OUTPATIENT)
Dept: PHYSICAL THERAPY | Facility: REHABILITATION | Age: 63
End: 2023-10-10
Attending: FAMILY MEDICINE
Payer: COMMERCIAL

## 2023-10-16 ENCOUNTER — OCCUPATIONAL MEDICINE (OUTPATIENT)
Dept: OCCUPATIONAL MEDICINE | Facility: CLINIC | Age: 63
End: 2023-10-16
Payer: COMMERCIAL

## 2023-10-16 VITALS
DIASTOLIC BLOOD PRESSURE: 80 MMHG | WEIGHT: 184 LBS | OXYGEN SATURATION: 98 % | HEART RATE: 66 BPM | SYSTOLIC BLOOD PRESSURE: 130 MMHG | BODY MASS INDEX: 27.89 KG/M2 | RESPIRATION RATE: 18 BRPM | HEIGHT: 68 IN

## 2023-10-16 DIAGNOSIS — S46.911D STRAIN OF RIGHT SHOULDER, SUBSEQUENT ENCOUNTER: ICD-10-CM

## 2023-10-16 DIAGNOSIS — S76.011D HIP STRAIN, RIGHT, SUBSEQUENT ENCOUNTER: ICD-10-CM

## 2023-10-16 DIAGNOSIS — S39.012D STRAIN OF LUMBAR REGION, SUBSEQUENT ENCOUNTER: ICD-10-CM

## 2023-10-16 PROCEDURE — 99213 OFFICE O/P EST LOW 20 MIN: CPT | Performed by: PREVENTIVE MEDICINE

## 2023-10-16 ASSESSMENT — FIBROSIS 4 INDEX: FIB4 SCORE: 0.98

## 2023-10-16 NOTE — LETTER
[address],   [address] [address]  Phone:  [phone] - Fax:  [phone]   Occupational Health [facility] Progress Report and Disability Certification  Date of Service: 10/16/2023   No Show:  No  Date / Time of Next Visit: 10/23/2023@7:45 AM   Claim Information   Patient Name: Amelia Thakkar  Claim Number:     Employer: [employer]  Date of Injury: 9/5/2023     Insurer / TPA: [employer]  ID / SSN:     Occupation: [employer]  Diagnosis: Diagnoses of Strain of right shoulder, subsequent encounter, Strain of lumbar region, subsequent encounter, and Hip strain, right, subsequent encounter were pertinent to this visit.    Medical Information   Related to Industrial Injury? 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  JOELLE: Multiple injuries due to metal cage falling on her with subsequent ground level fall.  Injured body parts to include head, right shoulder, left wrist, right wrist, right hip, low back, right knee and right ankle.  Patient's overall she is had gradual improvement but does continue persistent pain in the right shoulder, low back, right knee and left wrist    Patient states that overall symptoms have been improving significantly.  She states that the remaining issues are the right knee and right hip.  Mostly of the right knee.  She states she is doing physical therapy she has done 3 of 6 sessions which has been helping a lot.  She states that she feels around 80% improved she notes that she does feel that she is almost ready for full duty.   Objective Findings: Right shoulder: No gross deformity.  Full range of motion  Lumbar: No gross deformity.  Full range of motion   Right knee: No gross deformity.  Area of pain over the medial knee.  Able to squat with minimal discomfort.  Slight antalgic gait.     Pre-Existing Condition(s):     Assessment:    Condition Improved    Status: Additional Care Required  Permanent Disability:No    Plan:      Diagnostics:      Comments:  Continue physical therapy  OTC ibuprofen/Tylenol as needed  Okay to use heat/ice as needed  Restricted duty  Follow-up 1 week, if improvement continues will release to full duty    Disability Information   Status: Released to Restricted Duty    From:  10/16/2023  Through: 10/23/2023 Restrictions are: Temporary   Physical Restrictions   Sitting:    Standing:    Stooping:    Bending:      Squatting:  < or = to 2 hrs/day Walking:    Climbing:    Pushing:      Pulling:    Other:    Reaching Above Shoulder (L):   Reaching Above Shoulder (R):       Reaching Below Shoulder (L):    Reaching Below Shoulder (R):      Not to exceed Weight Limits   Carrying(hrs):   Weight Limit(lb):   Lifting(hrs):   Weight  Limit(lb):     Comments: Limit to less than 20 pounds lift/push/pull.     Repetitive Actions   Hands: i.e. Fine Manipulations from Grasping:     Feet: i.e. Operating Foot Controls:     Driving / Operate Machinery:     Health Care Provider’s Original or Electronic Signature  Dave Mckeon D.O. Health Care Provider’s Original or Electronic Signature    Dave Mckeon DO MPH     Clinic Name / Location: [address],   [address]  [place] [address] Clinic Phone Number: Dept: [phone]   Appointment Time: 7:30 Am Visit Start Time: 7:37 AM   Check-In Time:  7:30 Am Visit Discharge Time:  7:55 AM   Original-Treating Physician or Chiropractor    Page 2-Insurer/TPA    Page 3-Employer    Page 4-Employee

## 2023-10-17 ENCOUNTER — APPOINTMENT (OUTPATIENT)
Dept: PHYSICAL THERAPY | Facility: REHABILITATION | Age: 63
End: 2023-10-17
Attending: FAMILY MEDICINE
Payer: COMMERCIAL

## 2023-10-23 ENCOUNTER — OCCUPATIONAL MEDICINE (OUTPATIENT)
Dept: OCCUPATIONAL MEDICINE | Facility: CLINIC | Age: 63
End: 2023-10-23
Payer: COMMERCIAL

## 2023-10-23 VITALS
HEART RATE: 76 BPM | RESPIRATION RATE: 18 BRPM | HEIGHT: 68 IN | DIASTOLIC BLOOD PRESSURE: 72 MMHG | BODY MASS INDEX: 27.74 KG/M2 | TEMPERATURE: 97 F | SYSTOLIC BLOOD PRESSURE: 124 MMHG | WEIGHT: 183 LBS | OXYGEN SATURATION: 98 %

## 2023-10-23 DIAGNOSIS — S46.911D STRAIN OF RIGHT SHOULDER, SUBSEQUENT ENCOUNTER: ICD-10-CM

## 2023-10-23 DIAGNOSIS — S76.011D HIP STRAIN, RIGHT, SUBSEQUENT ENCOUNTER: ICD-10-CM

## 2023-10-23 DIAGNOSIS — S86.911D KNEE STRAIN, RIGHT, SUBSEQUENT ENCOUNTER: ICD-10-CM

## 2023-10-23 DIAGNOSIS — S01.01XD LACERATION OF SCALP, SUBSEQUENT ENCOUNTER: ICD-10-CM

## 2023-10-23 DIAGNOSIS — S39.012D STRAIN OF LUMBAR REGION, SUBSEQUENT ENCOUNTER: ICD-10-CM

## 2023-10-23 DIAGNOSIS — S66.912D STRAIN OF LEFT WRIST, SUBSEQUENT ENCOUNTER: ICD-10-CM

## 2023-10-23 PROCEDURE — 3078F DIAST BP <80 MM HG: CPT | Performed by: PREVENTIVE MEDICINE

## 2023-10-23 PROCEDURE — 3074F SYST BP LT 130 MM HG: CPT | Performed by: PREVENTIVE MEDICINE

## 2023-10-23 PROCEDURE — 99212 OFFICE O/P EST SF 10 MIN: CPT | Performed by: PREVENTIVE MEDICINE

## 2023-10-23 ASSESSMENT — FIBROSIS 4 INDEX: FIB4 SCORE: 0.98

## 2023-10-23 NOTE — LETTER
[address],   [address] [address]  Phone:  [phone] - Fax:  [phone]   Occupational Health [facility] Progress Report and Disability Certification  Date of Service: 10/23/2023   No Show:  No  Date / Time of Next Visit:  Release from care   Claim Information   Patient Name: Amelia Thakkar  Claim Number:     Employer: [employer]  Date of Injury: 9/5/2023     Insurer / TPA: [employer]  ID / SSN:     Occupation: [employer]  Diagnosis: Diagnoses of Hip strain, right, subsequent encounter, Strain of left wrist, subsequent encounter, Strain of lumbar region, subsequent encounter, Knee strain, right, subsequent encounter, Strain of right shoulder, subsequent encounter, and Laceration of scalp, subsequent encounter were pertinent to this visit.    Medical Information   Related to Industrial Injury? 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  JOELLE: Multiple injuries due to metal cage falling on her with subsequent ground level fall.  Injured body parts to include head, right shoulder, left wrist, right wrist, right hip, low back, right knee and right ankle.  Patient's overall she is had gradual improvement but does continue persistent pain in the right shoulder, low back, right knee and left wrist     10/16/2023: Patient states that overall symptoms have been improving significantly.  She states that the remaining issues are the right knee and right hip.  Mostly of the right knee.  She states she is doing physical therapy she has done 3 of 6 sessions which has been helping a lot.  She states that she feels around 80% improved she notes that she does feel that she is almost ready for full duty.    10/23/2023: She notes that symptoms can continue to be improving significantly.  She states she was able to go over 6000 steps without any pain or soreness afterwards.  She  has 2 more sessions of physical therapy.  She feels ready to return to work.  She does note that there is a strike at her workplace and so she will not be actually going back to work quite yet.  But feels like she would be able to at this point.   Objective Findings: Right knee: No gross deformity.  Full range of motion.  Normal gait.      Pre-Existing Condition(s):     Assessment:   Condition Improved    Status: Discharged /  MMI  Permanent Disability:No    Plan:      Diagnostics:      Comments:  Finish remaining physical therapy visits, will be discharged from physical therapy with home exercise program to continue  Full duty  If symptoms significantly  worsen upon returning to work return to clinic.    Disability Information   Status: Released to Full Duty    From:  10/23/2023  Through:   Restrictions are:     Physical Restrictions   Sitting:    Standing:    Stooping:    Bending:      Squatting:    Walking:    Climbing:    Pushing:      Pulling:    Other:    Reaching Above Shoulder (L):   Reaching Above Shoulder (R):       Reaching Below Shoulder (L):    Reaching Below Shoulder (R):      Not to exceed Weight Limits   Carrying(hrs):   Weight Limit(lb):   Lifting(hrs):   Weight  Limit(lb):     Comments:      Repetitive Actions   Hands: i.e. Fine Manipulations from Grasping:     Feet: i.e. Operating Foot Controls:     Driving / Operate Machinery:     Health Care Provider’s Original or Electronic Signature  Dave Mckeon D.O. Health Care Provider’s Original or Electronic Signature    Dave Mckeon DO MPH     Clinic Name / Location: [address],   [address]  [place] [address] Clinic Phone Number: Dept: [phone]   Appointment Time: 7:45 Am Visit Start Time: 7:43 AM   Check-In Time:  7:36 Am Visit Discharge Time:  7:51 AM    Original-Treating Physician or Chiropractor    Page 2-Insurer/TPA    Page 3-Employer    Page 4-Employee

## 2023-10-23 NOTE — PROGRESS NOTES
"Subjective:     Amelia Thakkar is a 63 y.o. 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  JOELLE: Multiple injuries due to metal cage falling on her with subsequent ground level fall.  Injured body parts to include head, right shoulder, left wrist, right wrist, right hip, low back, right knee and right ankle.  Patient's overall she is had gradual improvement but does continue persistent pain in the right shoulder, low back, right knee and left wrist     10/16/2023: Patient states that overall symptoms have been improving significantly.  She states that the remaining issues are the right knee and right hip.  Mostly of the right knee.  She states she is doing physical therapy she has done 3 of 6 sessions which has been helping a lot.  She states that she feels around 80% improved she notes that she does feel that she is almost ready for full duty.    10/23/2023: She notes that symptoms can continue to be improving significantly.  She states she was able to go over 6000 steps without any pain or soreness afterwards.  She has 2 more sessions of physical therapy.  She feels ready to return to work.  She does note that there is a strike at her workplace and so she will not be actually going back to work quite yet.  But feels like she would be able to at this point.    ROS         Objective:     /72   Pulse 76   Temp 36.1 °C (97 °F) (Temporal)   Resp 18   Ht 1.727 m (5' 8\")   Wt 83 kg (183 lb)   SpO2 98%   BMI 27.83 kg/m²     Constitutional: Patient is in no acute distress. Appears well-developed and well-nourished.   Cardiovascular: Normal rate.    Pulmonary/Chest: Effort normal. No respiratory distress.   Neurological: Patient is alert and oriented to person, place, and time.   Skin: Skin is warm and dry.   Psychiatric: Normal mood and affect. Behavior is normal.     Right knee: No gross deformity.  Full range of motion.  Normal gait.   "     Assessment/Plan:       1. Hip strain, right, subsequent encounter    2. Strain of left wrist, subsequent encounter    3. Strain of lumbar region, subsequent encounter    4. Knee strain, right, subsequent encounter    5. Strain of right shoulder, subsequent encounter    6. Laceration of scalp, subsequent encounter    Released to Full Duty FROM 10/23/2023 TO       Finish remaining physical therapy visits, will be discharged from physical therapy with home exercise program to continue  Full duty  If symptoms significantly  worsen upon returning to work return to clinic.    Differential diagnosis, natural history, supportive care, and indications for immediate follow-up discussed.    Approximately 15 minutes was spent in preparing for visit, obtaining history, exam and evaluation, patient counseling/education and post visit documentation/orders.

## 2023-10-24 ENCOUNTER — APPOINTMENT (OUTPATIENT)
Dept: PHYSICAL THERAPY | Facility: REHABILITATION | Age: 63
End: 2023-10-24
Attending: FAMILY MEDICINE
Payer: COMMERCIAL

## 2025-01-13 ENCOUNTER — APPOINTMENT (OUTPATIENT)
Dept: RADIOLOGY | Facility: IMAGING CENTER | Age: 65
End: 2025-01-13
Payer: COMMERCIAL

## 2025-01-13 ENCOUNTER — OFFICE VISIT (OUTPATIENT)
Dept: URGENT CARE | Facility: PHYSICIAN GROUP | Age: 65
End: 2025-01-13
Payer: COMMERCIAL

## 2025-01-13 VITALS
HEART RATE: 83 BPM | BODY MASS INDEX: 27.45 KG/M2 | SYSTOLIC BLOOD PRESSURE: 108 MMHG | RESPIRATION RATE: 20 BRPM | DIASTOLIC BLOOD PRESSURE: 62 MMHG | HEIGHT: 68 IN | TEMPERATURE: 97.6 F | OXYGEN SATURATION: 97 % | WEIGHT: 181.1 LBS

## 2025-01-13 DIAGNOSIS — J06.9 UPPER RESPIRATORY TRACT INFECTION, UNSPECIFIED TYPE: ICD-10-CM

## 2025-01-13 DIAGNOSIS — R05.1 ACUTE COUGH: ICD-10-CM

## 2025-01-13 DIAGNOSIS — J10.1 INFLUENZA A: ICD-10-CM

## 2025-01-13 DIAGNOSIS — R06.02 SHORTNESS OF BREATH: ICD-10-CM

## 2025-01-13 DIAGNOSIS — Z20.828 EXPOSURE TO RESPIRATORY SYNCYTIAL VIRUS (RSV): ICD-10-CM

## 2025-01-13 LAB
FLUAV RNA SPEC QL NAA+PROBE: POSITIVE
FLUBV RNA SPEC QL NAA+PROBE: NEGATIVE
RSV RNA SPEC QL NAA+PROBE: NEGATIVE
SARS-COV-2 RNA RESP QL NAA+PROBE: NEGATIVE

## 2025-01-13 PROCEDURE — 99214 OFFICE O/P EST MOD 30 MIN: CPT

## 2025-01-13 PROCEDURE — 0241U POCT CEPHEID COV-2, FLU A/B, RSV - PCR: CPT

## 2025-01-13 PROCEDURE — 71046 X-RAY EXAM CHEST 2 VIEWS: CPT | Mod: TC | Performed by: RADIOLOGY

## 2025-01-13 RX ORDER — BENZONATATE 100 MG/1
100 CAPSULE ORAL 3 TIMES DAILY PRN
Qty: 60 CAPSULE | Refills: 0 | Status: SHIPPED | OUTPATIENT
Start: 2025-01-13 | End: 2025-01-30

## 2025-01-13 ASSESSMENT — ENCOUNTER SYMPTOMS
ABDOMINAL PAIN: 0
NAUSEA: 0
SWEATS: 0
FEVER: 0
VOMITING: 0
FALLS: 0
SPUTUM PRODUCTION: 1
DIARRHEA: 0
HEMOPTYSIS: 0
HEADACHES: 1
PALPITATIONS: 0
SHORTNESS OF BREATH: 1
COUGH: 1
MYALGIAS: 0
RHINORRHEA: 1
BLURRED VISION: 0
CHILLS: 0
SORE THROAT: 0
WHEEZING: 0
WEIGHT LOSS: 0
DIZZINESS: 0
FOCAL WEAKNESS: 0
WEAKNESS: 1
LOSS OF CONSCIOUSNESS: 0

## 2025-01-13 ASSESSMENT — COPD QUESTIONNAIRES: COPD: 0

## 2025-01-13 NOTE — LETTER
January 13, 2025    To Whom It May Concern:         This is confirmation that Amelia M Bijan attended her scheduled appointment with Zulema Lopez M.D. PhD on 1/13/25.  She is to be excused from work up until Wednesday, 15 January.         If you have any questions please do not hesitate to call me at the phone number listed below.    Sincerely,          Zulema Lopez M.D. PhD  [phone]

## 2025-01-13 NOTE — PROGRESS NOTES
Subjective:     Amelia Thakkar is a 64 y.o. female who presents for Shortness of Breath (X6 days)      Shortness of Breath  This is a new problem. The current episode started in the past 7 days. The problem occurs constantly. The problem has been gradually worsening. Associated symptoms include headaches, rhinorrhea and sputum production. Pertinent negatives include no abdominal pain, chest pain, fever, hemoptysis, leg swelling, rash, sore throat, vomiting or wheezing. The symptoms are aggravated by exercise and occupational exposure. The patient has no known risk factors for DVT/PE. She has tried OTC cough suppressants and rest for the symptoms. The treatment provided no relief. There is no history of asthma, COPD or pneumonia.   Cough  This is a new problem. The current episode started in the past 7 days. The problem has been gradually worsening. Associated symptoms include headaches, nasal congestion, rhinorrhea and shortness of breath. Pertinent negatives include no chest pain, chills, fever, hemoptysis, myalgias, rash, sore throat, sweats, weight loss or wheezing. Associated symptoms comments: Decreased appetite. The symptoms are aggravated by cold air. Risk factors for lung disease include occupational exposure. She has tried nothing for the symptoms. The treatment provided no relief. There is no history of asthma, bronchiectasis, bronchitis, COPD, emphysema, environmental allergies or pneumonia.       Review of Systems   Constitutional:  Positive for malaise/fatigue. Negative for chills, fever and weight loss.   HENT:  Positive for congestion and rhinorrhea. Negative for ear discharge and sore throat.    Eyes:  Negative for blurred vision.   Respiratory:  Positive for cough, sputum production and shortness of breath. Negative for hemoptysis and wheezing.    Cardiovascular:  Negative for chest pain, palpitations and leg swelling.   Gastrointestinal:  Negative for abdominal pain, diarrhea, nausea and  "vomiting.   Musculoskeletal:  Negative for falls and myalgias.   Skin:  Negative for itching and rash.   Neurological:  Positive for weakness and headaches. Negative for dizziness, focal weakness and loss of consciousness.   Endo/Heme/Allergies:  Negative for environmental allergies.        CURRENT MEDICATIONS:  albuterol Aers    Allergies:     Allergies   Allergen Reactions    Other Drug Unspecified     Congestion    Phenazopyridine Rash    Phenazopyridine Hcl Rash    Azo-Gantrisin Rash       Current Problems: Amelia Thakkar does not have any pertinent problems on file.  Past Surgical Hx:    Past Surgical History:   Procedure Laterality Date    NASAL SEPTAL RECONSTRUCTION  08/2021    LUMPECTOMY Right 1988    fibocystic    OTHER      bilateral vein stripping vs ablation?      Past Social Hx:  reports that she has never smoked. She has never used smokeless tobacco. She reports current alcohol use. She reports that she does not use drugs.   Past Family Hx:  Amelia Thakkar family history includes Cancer in her maternal grandfather, paternal aunt, and another family member; Diabetes in her father and mother; Hyperlipidemia in her father; Hypertension in her father, paternal aunt, and paternal uncle; No Known Problems in her daughter.     (Allergies, Medications, & Tobacco/Substance Use were reconciled by the Medical Assistant and reviewed by myself. The family history is prepopulated)       Objective:     /62 (BP Location: Left arm, Patient Position: Sitting, BP Cuff Size: Adult)   Pulse 83   Temp 36.4 °C (97.6 °F) (Temporal)   Resp 20   Ht 1.727 m (5' 8\")   Wt 82.1 kg (181 lb 1.6 oz)   SpO2 97%   BMI 27.54 kg/m²     Physical Exam  Constitutional:       General: She is not in acute distress.     Appearance: Normal appearance. She is ill-appearing. She is not toxic-appearing or diaphoretic.   HENT:      Head: Normocephalic and atraumatic.      Nose: Congestion and rhinorrhea present.   Eyes:      " General: No scleral icterus.        Right eye: No discharge.         Left eye: No discharge.   Cardiovascular:      Rate and Rhythm: Normal rate and regular rhythm.      Heart sounds: Normal heart sounds. No murmur heard.     No friction rub. No gallop.   Pulmonary:      Effort: Pulmonary effort is normal. No respiratory distress.      Breath sounds: No stridor. No wheezing, rhonchi or rales.   Chest:      Chest wall: No tenderness.   Abdominal:      General: Abdomen is flat.      Palpations: Abdomen is soft.   Musculoskeletal:         General: Normal range of motion.      Cervical back: No rigidity.   Skin:     General: Skin is warm and dry.   Neurological:      General: No focal deficit present.      Mental Status: She is alert and oriented to person, place, and time. Mental status is at baseline.   Psychiatric:         Behavior: Behavior normal.         Judgment: Judgment normal.         Assessment/Plan:     Kourtney was seen today for shortness of breath.    Diagnoses and all orders for this visit:    Shortness of breath  -     POCT CoV-2, Flu A/B, RSV by PCR  -     DX-CHEST-2 VIEWS; Future    Exposure to respiratory syncytial virus (RSV)    Upper respiratory tract infection, unspecified type    Acute cough  -     benzonatate (TESSALON) 100 MG Cap; Take 1 Capsule by mouth 3 times a day as needed for Cough.    Influenza A      Based on history of presenting illness, review of systems and physical exam findings, most likely etiology of cough and shortness of breath is viral URI, likely RSV +/- due to prolonged exposure to sick contact (wife). Discussed symptomatic management with pharmacotherapy and over-the-counter medications.      On my exam I do not see any signs or symptoms consistent with a bacterial infection currently requiring oral antibiotics. Chest xray is reassuring.  Point-of-care testing for viral panel positive for influenza A however the patient out of window for Tamiflu.     Discussed the risks the  benefits and the indications of all new medications prescribed today.  Strict return and ED precautions were discussed and all questions were answered.      Differential diagnosis, natural history, supportive care, and indications for immediate follow-up discussed.    Advised the patient to follow-up with the primary care physician for recheck, reevaluation, and consideration of further management.    Please note that this dictation was created using voice recognition software. I have made reasonable attempt to correct obvious errors, but I expect that there are errors of grammar and possibly content that I did not discover before finalizing the note.    This note was electronically signed by Zulema Lopez MD PhD

## 2025-01-13 NOTE — LETTER
January 17, 2025    To Whom It May Concern:         This is confirmation that Amelia KELLER Bijan attended her scheduled appointment with Zulema Lopez MD PhD on 1/13/25. She may return to work on 1/20/2025.          If you have any questions please do not hesitate to call me at the phone number listed below.    Sincerely,          Zulema Lopez M.D.PhD  [phone]

## 2025-01-17 ENCOUNTER — TELEPHONE (OUTPATIENT)
Dept: URGENT CARE | Facility: PHYSICIAN GROUP | Age: 65
End: 2025-01-17

## 2025-01-17 NOTE — TELEPHONE ENCOUNTER
"Patient is calling today requesting an extension on her work not for the Return date back to work to be 01/20/2025. Patient states her employer put her on \"sick\" leave so they are asking her for a \"return to work note\" they are also asking for FMLA which she is going to do with PCP but she has not seen her PCP since 2021 please advise. Thank you.  "

## 2025-01-30 ENCOUNTER — OFFICE VISIT (OUTPATIENT)
Dept: MEDICAL GROUP | Facility: LAB | Age: 65
End: 2025-01-30
Payer: COMMERCIAL

## 2025-01-30 VITALS
OXYGEN SATURATION: 97 % | RESPIRATION RATE: 16 BRPM | HEIGHT: 68 IN | TEMPERATURE: 97.1 F | DIASTOLIC BLOOD PRESSURE: 60 MMHG | WEIGHT: 180 LBS | SYSTOLIC BLOOD PRESSURE: 98 MMHG | BODY MASS INDEX: 27.28 KG/M2 | HEART RATE: 62 BPM

## 2025-01-30 DIAGNOSIS — J45.20 MILD INTERMITTENT ASTHMA WITHOUT COMPLICATION: ICD-10-CM

## 2025-01-30 DIAGNOSIS — Z12.31 ENCOUNTER FOR SCREENING MAMMOGRAM FOR BREAST CANCER: ICD-10-CM

## 2025-01-30 DIAGNOSIS — Z02.89 ENCOUNTER FOR COMPLETION OF FORM WITH PATIENT: ICD-10-CM

## 2025-01-30 DIAGNOSIS — Z87.09 HISTORY OF INFLUENZA: ICD-10-CM

## 2025-01-30 DIAGNOSIS — E78.5 HYPERLIPIDEMIA, UNSPECIFIED HYPERLIPIDEMIA TYPE: ICD-10-CM

## 2025-01-30 RX ORDER — ALBUTEROL SULFATE 90 UG/1
2 INHALANT RESPIRATORY (INHALATION) EVERY 4 HOURS PRN
Qty: 8.5 G | Refills: 1 | Status: SHIPPED | OUTPATIENT
Start: 2025-01-30

## 2025-01-30 RX ORDER — TRIAMCINOLONE ACETONIDE 1 MG/G
1 CREAM TOPICAL 2 TIMES DAILY
COMMUNITY
Start: 2024-11-20

## 2025-01-30 ASSESSMENT — ENCOUNTER SYMPTOMS
MYALGIAS: 0
WEIGHT LOSS: 0
FEVER: 0
NAUSEA: 0
VOMITING: 0
COUGH: 0
CHILLS: 0
SORE THROAT: 0
DIARRHEA: 0
HEADACHES: 0
ABDOMINAL PAIN: 0

## 2025-01-30 ASSESSMENT — PATIENT HEALTH QUESTIONNAIRE - PHQ9: CLINICAL INTERPRETATION OF PHQ2 SCORE: 0

## 2025-01-30 NOTE — PROGRESS NOTES
Verbal consent was acquired by the patient to use [employer] ambient listening note generation during this visit Yes.    Subjective:     Chief Complaint   Patient presents with    Other     Flu A; FV    Paperwork     HPI:     History of Present Illness  The patient is a 64-year-old female who presents for follow-up and paperwork.    She was diagnosed with influenza A on 01/13/2025, following her wife's hospitalization on 01/06/2025 due to respiratory distress. She suspects potential exposure to the virus at the hospital or a possible misdiagnosis of her wife's condition, which was identified as RSV.     She reports a significant improvement in her condition, estimating her recovery at approximately 85 to 90 percent. 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 Her absence from work spanned from 01/08/2025 to 01/23/2025, with her return on 01/24/2025. She sought medical attention at an urgent care facility on 01/13/2025 and had a subsequent visit on 01/20/2025. She reports no current symptoms of congestion, headaches, or ear pain.    She reports experiencing dyspnea when descending stairs, necessitating brief pauses to regain her breath. She does not currently possess an albuterol inhaler and requests a prescription for one. Her asthma, typically mild, is triggered by chemical exposures at her workplace. She also reports occasional weakness but feels prepared to resume her full workload.    She has attempted to receive vaccinations at [facility] but was informed that they were not being administered at that time. She expresses concern about potential increases in her lipid levels, despite maintaining a diet rich in fruits and vegetables and avoiding processed and fast foods.    Review of Systems   Constitutional:  Negative for chills, fever, malaise/fatigue and weight loss.   HENT:  Negative for congestion and sore throat.   "  Respiratory:  Negative for cough.    Cardiovascular:  Negative for chest pain.   Gastrointestinal:  Negative for abdominal pain, diarrhea, nausea and vomiting.   Musculoskeletal:  Negative for myalgias.   Skin:  Negative for rash.   Neurological:  Negative for headaches.       Objective:     Exam:  BP 98/60 (BP Location: Left arm, Patient Position: Sitting, BP Cuff Size: Adult)   Pulse 62   Temp 36.2 °C (97.1 °F) (Temporal)   Resp 16   Ht 1.727 m (5' 8\")   Wt 81.6 kg (180 lb)   SpO2 97%   BMI 27.37 kg/m²  Body mass index is 27.37 kg/m².    Physical Exam  Vitals reviewed.   Constitutional:       General: She is not in acute distress.     Appearance: Normal appearance. She is not ill-appearing.   HENT:      Head: Normocephalic and atraumatic.      Nose: Nose normal.      Mouth/Throat:      Mouth: Mucous membranes are moist.   Eyes:      Conjunctiva/sclera: Conjunctivae normal.   Cardiovascular:      Rate and Rhythm: Normal rate and regular rhythm.      Heart sounds: Normal heart sounds. No murmur heard.  Pulmonary:      Effort: Pulmonary effort is normal. No respiratory distress.      Breath sounds: Normal breath sounds. No stridor. No wheezing, rhonchi or rales.   Musculoskeletal:      Cervical back: Normal range of motion and neck supple. No rigidity or tenderness.   Skin:     General: Skin is warm and dry.   Neurological:      General: No focal deficit present.      Mental Status: She is alert and oriented to person, place, and time.   Psychiatric:         Mood and Affect: Mood normal.         Behavior: Behavior normal.         Thought Content: Thought content normal.       Labs: Reviewed from 5/2022  Imaging: Reviewed mammogram 2022    Assessment & Plan:     64 y.o. female with the following -     1. Encounter for completion of form with patient  2. History of influenza  Paperwork completed and returned to patient.     3. Mild intermittent asthma without complication  Chronic, stable. Continue " medication(s) as below. Vaccines advised.   - albuterol 108 (90 Base) MCG/ACT Aero Soln inhalation aerosol; Inhale 2 Puffs every four hours as needed for Shortness of Breath.  Dispense: 8.5 g; Refill: 1  - CBC WITH DIFFERENTIAL; Future    4. Hyperlipidemia, unspecified hyperlipidemia type  Chronic, due to trend. Discussed ideal ranges and ways to improve with lifestyle choices.   - Comp Metabolic Panel; Future  - TSH WITH REFLEX TO FT4; Future  - Lipid Profile; Future    5. Encounter for screening mammogram for breast cancer  - MA-SCREENING MAMMO BILAT W/TOMOSYNTHESIS W/CAD; Future    Return in about 2 months (around 4/2/2025), or if symptoms worsen or fail to improve, for Annual.    Please note that this dictation was created using voice recognition software. I have made every reasonable attempt to correct obvious errors, but I expect that there are errors of grammar and possibly content that I did not discover before finalizing the note.

## 2025-01-30 NOTE — PATIENT INSTRUCTIONS
COVID  RSV (respiratory syncytial virus)  Pneumococcal 20  Flu    fasting labs due 1-2 weeks prior to next visit

## 2025-02-03 ENCOUNTER — TELEPHONE (OUTPATIENT)
Dept: MEDICAL GROUP | Facility: LAB | Age: 65
End: 2025-02-03
Payer: COMMERCIAL

## 2025-02-03 DIAGNOSIS — J10.1 INFLUENZA A: ICD-10-CM

## 2025-02-03 NOTE — TELEPHONE ENCOUNTER
Patient called stating she was seen last week by Dr. Simms and is in need of ICD 10 code for influenza A, it needs to be specific to what she was diagnosed with.    Contact # [phone].    Please call or text code to this number.

## 2025-02-05 NOTE — TELEPHONE ENCOUNTER
Phone Number Called: [phone] (home)      Call outcome: Left detailed message for patient. Informed to call back with any additional questions.    Message: LVM for pt letting her know the IDC 10 code for influenza A in J10.1 if further questions to call our office back at [phone]

## 2025-02-12 ENCOUNTER — RESULTS FOLLOW-UP (OUTPATIENT)
Dept: MEDICAL GROUP | Facility: LAB | Age: 65
End: 2025-02-12

## 2025-02-12 ENCOUNTER — HOSPITAL ENCOUNTER (OUTPATIENT)
Dept: RADIOLOGY | Facility: MEDICAL CENTER | Age: 65
End: 2025-02-12
Attending: STUDENT IN AN ORGANIZED HEALTH CARE EDUCATION/TRAINING PROGRAM
Payer: COMMERCIAL

## 2025-02-12 DIAGNOSIS — Z12.31 ENCOUNTER FOR SCREENING MAMMOGRAM FOR BREAST CANCER: ICD-10-CM

## 2025-02-12 DIAGNOSIS — E78.5 HYPERLIPIDEMIA, UNSPECIFIED HYPERLIPIDEMIA TYPE: ICD-10-CM

## 2025-02-12 DIAGNOSIS — D72.819 LEUKOPENIA, UNSPECIFIED TYPE: ICD-10-CM

## 2025-02-12 PROCEDURE — 77067 SCR MAMMO BI INCL CAD: CPT

## 2025-03-01 ENCOUNTER — HOSPITAL ENCOUNTER (OUTPATIENT)
Dept: LAB | Facility: MEDICAL CENTER | Age: 65
End: 2025-03-01
Attending: STUDENT IN AN ORGANIZED HEALTH CARE EDUCATION/TRAINING PROGRAM
Payer: COMMERCIAL

## 2025-03-01 DIAGNOSIS — J45.20 MILD INTERMITTENT ASTHMA WITHOUT COMPLICATION: ICD-10-CM

## 2025-03-01 DIAGNOSIS — E78.5 HYPERLIPIDEMIA, UNSPECIFIED HYPERLIPIDEMIA TYPE: ICD-10-CM

## 2025-03-01 LAB
BASOPHILS # BLD AUTO: 0.7 % (ref 0–1.8)
BASOPHILS # BLD: 0.03 K/UL (ref 0–0.12)
EOSINOPHIL # BLD AUTO: 0.17 K/UL (ref 0–0.51)
EOSINOPHIL NFR BLD: 3.9 % (ref 0–6.9)
ERYTHROCYTE [DISTWIDTH] IN BLOOD BY AUTOMATED COUNT: 48.5 FL (ref 35.9–50)
HCT VFR BLD AUTO: 43.1 % (ref 37–47)
HGB BLD-MCNC: 13.7 G/DL (ref 12–16)
IMM GRANULOCYTES # BLD AUTO: 0.01 K/UL (ref 0–0.11)
IMM GRANULOCYTES NFR BLD AUTO: 0.2 % (ref 0–0.9)
LYMPHOCYTES # BLD AUTO: 1.25 K/UL (ref 1–4.8)
LYMPHOCYTES NFR BLD: 28.5 % (ref 22–41)
MCH RBC QN AUTO: 31.9 PG (ref 27–33)
MCHC RBC AUTO-ENTMCNC: 31.8 G/DL (ref 32.2–35.5)
MCV RBC AUTO: 100.2 FL (ref 81.4–97.8)
MONOCYTES # BLD AUTO: 0.4 K/UL (ref 0–0.85)
MONOCYTES NFR BLD AUTO: 9.1 % (ref 0–13.4)
NEUTROPHILS # BLD AUTO: 2.53 K/UL (ref 1.82–7.42)
NEUTROPHILS NFR BLD: 57.6 % (ref 44–72)
NRBC # BLD AUTO: 0 K/UL
NRBC BLD-RTO: 0 /100 WBC (ref 0–0.2)
PLATELET # BLD AUTO: 300 K/UL (ref 164–446)
PMV BLD AUTO: 10.6 FL (ref 9–12.9)
RBC # BLD AUTO: 4.3 M/UL (ref 4.2–5.4)
WBC # BLD AUTO: 4.4 K/UL (ref 4.8–10.8)

## 2025-03-01 PROCEDURE — 85025 COMPLETE CBC W/AUTO DIFF WBC: CPT

## 2025-03-01 PROCEDURE — 36415 COLL VENOUS BLD VENIPUNCTURE: CPT

## 2025-03-31 SDOH — ECONOMIC STABILITY: INCOME INSECURITY: IN THE LAST 12 MONTHS, WAS THERE A TIME WHEN YOU WERE NOT ABLE TO PAY THE MORTGAGE OR RENT ON TIME?: NO

## 2025-03-31 SDOH — HEALTH STABILITY: PHYSICAL HEALTH: ON AVERAGE, HOW MANY DAYS PER WEEK DO YOU ENGAGE IN MODERATE TO STRENUOUS EXERCISE (LIKE A BRISK WALK)?: 0 DAYS

## 2025-03-31 SDOH — ECONOMIC STABILITY: FOOD INSECURITY: WITHIN THE PAST 12 MONTHS, THE FOOD YOU BOUGHT JUST DIDN'T LAST AND YOU DIDN'T HAVE MONEY TO GET MORE.: NEVER TRUE

## 2025-03-31 SDOH — HEALTH STABILITY: MENTAL HEALTH
STRESS IS WHEN SOMEONE FEELS TENSE, NERVOUS, ANXIOUS, OR CAN'T SLEEP AT NIGHT BECAUSE THEIR MIND IS TROUBLED. HOW STRESSED ARE YOU?: NOT AT ALL

## 2025-03-31 SDOH — ECONOMIC STABILITY: INCOME INSECURITY: HOW HARD IS IT FOR YOU TO PAY FOR THE VERY BASICS LIKE FOOD, HOUSING, MEDICAL CARE, AND HEATING?: NOT VERY HARD

## 2025-03-31 SDOH — ECONOMIC STABILITY: FOOD INSECURITY: WITHIN THE PAST 12 MONTHS, YOU WORRIED THAT YOUR FOOD WOULD RUN OUT BEFORE YOU GOT MONEY TO BUY MORE.: NEVER TRUE

## 2025-03-31 SDOH — HEALTH STABILITY: PHYSICAL HEALTH: ON AVERAGE, HOW MANY MINUTES DO YOU ENGAGE IN EXERCISE AT THIS LEVEL?: 0 MIN

## 2025-03-31 ASSESSMENT — SOCIAL DETERMINANTS OF HEALTH (SDOH)
DO YOU BELONG TO ANY CLUBS OR ORGANIZATIONS SUCH AS CHURCH GROUPS UNIONS, FRATERNAL OR ATHLETIC GROUPS, OR SCHOOL GROUPS?: NO
DO YOU BELONG TO ANY CLUBS OR ORGANIZATIONS SUCH AS CHURCH GROUPS UNIONS, FRATERNAL OR ATHLETIC GROUPS, OR SCHOOL GROUPS?: NO
HOW OFTEN DO YOU HAVE A DRINK CONTAINING ALCOHOL: 2-4 TIMES A MONTH
HOW OFTEN DO YOU ATTEND CHURCH OR RELIGIOUS SERVICES?: NEVER
HOW OFTEN DO YOU GET TOGETHER WITH FRIENDS OR RELATIVES?: NEVER
HOW OFTEN DO YOU ATTEND CHURCH OR RELIGIOUS SERVICES?: NEVER
HOW MANY DRINKS CONTAINING ALCOHOL DO YOU HAVE ON A TYPICAL DAY WHEN YOU ARE DRINKING: 1 OR 2
HOW OFTEN DO YOU ATTENT MEETINGS OF THE CLUB OR ORGANIZATION YOU BELONG TO?: NEVER
HOW OFTEN DO YOU GET TOGETHER WITH FRIENDS OR RELATIVES?: NEVER
WITHIN THE PAST 12 MONTHS, YOU WORRIED THAT YOUR FOOD WOULD RUN OUT BEFORE YOU GOT THE MONEY TO BUY MORE: NEVER TRUE
HOW OFTEN DO YOU ATTENT MEETINGS OF THE CLUB OR ORGANIZATION YOU BELONG TO?: NEVER
HOW OFTEN DO YOU HAVE SIX OR MORE DRINKS ON ONE OCCASION: NEVER
IN A TYPICAL WEEK, HOW MANY TIMES DO YOU TALK ON THE PHONE WITH FAMILY, FRIENDS, OR NEIGHBORS?: THREE TIMES A WEEK
IN THE PAST 12 MONTHS, HAS THE ELECTRIC, GAS, OIL, OR WATER COMPANY THREATENED TO SHUT OFF SERVICE IN YOUR HOME?: NO
HOW HARD IS IT FOR YOU TO PAY FOR THE VERY BASICS LIKE FOOD, HOUSING, MEDICAL CARE, AND HEATING?: NOT VERY HARD
IN A TYPICAL WEEK, HOW MANY TIMES DO YOU TALK ON THE PHONE WITH FAMILY, FRIENDS, OR NEIGHBORS?: THREE TIMES A WEEK

## 2025-03-31 ASSESSMENT — LIFESTYLE VARIABLES
HOW MANY STANDARD DRINKS CONTAINING ALCOHOL DO YOU HAVE ON A TYPICAL DAY: 1 OR 2
SKIP TO QUESTIONS 9-10: 1
AUDIT-C TOTAL SCORE: 2
HOW OFTEN DO YOU HAVE A DRINK CONTAINING ALCOHOL: 2-4 TIMES A MONTH
HOW OFTEN DO YOU HAVE SIX OR MORE DRINKS ON ONE OCCASION: NEVER

## 2025-04-02 ENCOUNTER — OFFICE VISIT (OUTPATIENT)
Dept: MEDICAL GROUP | Facility: LAB | Age: 65
End: 2025-04-02
Payer: COMMERCIAL

## 2025-04-02 VITALS
HEART RATE: 68 BPM | WEIGHT: 185.6 LBS | HEIGHT: 68 IN | TEMPERATURE: 97.1 F | DIASTOLIC BLOOD PRESSURE: 64 MMHG | RESPIRATION RATE: 14 BRPM | SYSTOLIC BLOOD PRESSURE: 114 MMHG | BODY MASS INDEX: 28.13 KG/M2 | OXYGEN SATURATION: 97 %

## 2025-04-02 DIAGNOSIS — K59.09 CHRONIC CONSTIPATION: ICD-10-CM

## 2025-04-02 DIAGNOSIS — D72.819 LEUKOPENIA, UNSPECIFIED TYPE: ICD-10-CM

## 2025-04-02 DIAGNOSIS — Z00.00 ENCOUNTER FOR ANNUAL GENERAL MEDICAL EXAMINATION WITHOUT ABNORMAL FINDINGS IN ADULT: ICD-10-CM

## 2025-04-02 DIAGNOSIS — D75.89 MACROCYTOSIS WITHOUT ANEMIA: ICD-10-CM

## 2025-04-02 DIAGNOSIS — E78.5 HYPERLIPIDEMIA, UNSPECIFIED HYPERLIPIDEMIA TYPE: ICD-10-CM

## 2025-04-02 PROCEDURE — 99396 PREV VISIT EST AGE 40-64: CPT | Performed by: STUDENT IN AN ORGANIZED HEALTH CARE EDUCATION/TRAINING PROGRAM

## 2025-04-02 PROCEDURE — 3078F DIAST BP <80 MM HG: CPT | Performed by: STUDENT IN AN ORGANIZED HEALTH CARE EDUCATION/TRAINING PROGRAM

## 2025-04-02 PROCEDURE — 3074F SYST BP LT 130 MM HG: CPT | Performed by: STUDENT IN AN ORGANIZED HEALTH CARE EDUCATION/TRAINING PROGRAM

## 2025-04-02 ASSESSMENT — ENCOUNTER SYMPTOMS
ABDOMINAL PAIN: 0
NAUSEA: 0
DIARRHEA: 0
WEIGHT LOSS: 0
VOMITING: 0
CHILLS: 0
FEVER: 0
BLOOD IN STOOL: 0
SHORTNESS OF BREATH: 0
COUGH: 0

## 2025-04-02 NOTE — PROGRESS NOTES
Subjective:     Chief Complaint   Patient presents with    Annual Exam       HPI:   Amelia Thakkar is a 64 y.o. female who presents for annual exam.    Verbal consent was acquired by the patient to use [employer] ambient listening note generation during this visit: YES    History of Present Illness  The patient is a 64-year-old female who presents for her annual exam.    Her diet is predominantly healthy, consisting of home-cooked meals prepared on Sundays and consumed throughout the week. She avoids dining out and includes a variety of fruits and vegetables in her diet, such as bananas, apples, dates, tangerines, roasted vegetables, squash, zucchini, [place] sprouts, and asparagus. Her protein intake is primarily from chicken, with beef consumption limited to once or twice a year. She engages in daily stretching exercises and maintains an active lifestyle at work. She is more physically active during the summer months, engaging in gardening activities.     She takes calcium supplements (600 mg) with vitamin D. She has a family history of bone loss, particularly among her aunts. She has not experienced any falls or fractures.     She has asthma, which is typically triggered by chemical exposure. Her respiratory function has been stable recently, even during a bout of influenza when her lungs were reported to be clear. She has some vision impairment and uses bifocals for reading. She also has some hearing loss, which is monitored annually at her workplace. She has been informed that her hearing has improved since moving to her current location. She maintains good oral hygiene and does not believe she grinds her teeth, although she has experienced chipping.     She has a history of constipation since childhood, which has improved with the use of Chinese teas. Prior to this intervention, she would have a bowel movement once a week. She reports no blood in her stool but describes it as hard and compacted prior  but improved to 4 times a week and soft.     She has a history of basal cell carcinoma and sees her dermatologist twice a year. She uses sunscreen regularly.    SOCIAL HISTORY  She drinks alcohol occasionally, about 2 glasses of wine per month. She does not smoke.    Ob-Gyn/ History:    /Para:   History of abnormal pap smears: no  No vaginal bleeding.  Sexually active: yes. Number of partners in the past year: 1   Dyspareunia: no.  Urinary incontinence: yes-attributes to delaying urination at work.    Health Maintenance  Osteoporosis Screen/ DEXA: Due at 67  Diet/exercise: Reviewed.   Cholesterol Screening:   Lab Results   Component Value Date     (H) 05/10/2022   Diabetes Screening:   Lab Results   Component Value Date    HBA1C 5.4 2022   Aspirin Use: Not indicated The 10-year ASCVD risk score (Lashanda JARAMILLO, et al., 2019) is: 3.4%    Cancer screening  Colorectal Cancer Screening: Up to date.   Lung Cancer Screening: Not indicated  Cervical Cancer Screening: Up to date  Breast Cancer Screening: Up to date.     Infectious disease screening/Immunizations  --Immunizations: Reviewed with patient.   --Hepatitis C Screening: Complete/negative prior  --HIV Screening: Complete/negative prior. Risk factors: no    She  has a past medical history of Anemia, Asthma, and Malignant neoplasm of skin (2012).  She  has a past surgical history that includes nasal septal reconstruction (2021); lumpectomy (Right, 1988); other; US-CYST ASPIRATION-BREAST INITIAL (); and breast biopsy ().    Family History   Problem Relation Age of Onset    Diabetes Mother     Diabetes Father     Hypertension Father     Hyperlipidemia Father     Breast Cancer Paternal Aunt     Hypertension Paternal Aunt     Cancer Paternal Aunt         1970’s full Mastectomy    Hypertension Paternal Uncle     Cancer Maternal Grandfather         Multiple Skin pre cancer spots removed    ADD / ADHD Daughter     Cancer Other          uterine - cousin paternal    Heart Disease Neg Hx     Stroke Neg Hx     Colon Cancer Neg Hx        Social History     Socioeconomic History    Marital status: Single     Spouse name: Not on file    Number of children: Not on file    Years of education: Not on file    Highest education level: Associate degree: academic program   Occupational History    Not on file   Tobacco Use    Smoking status: Never    Smokeless tobacco: Never   Vaping Use    Vaping status: Never Used   Substance and Sexual Activity    Alcohol use: Yes     Alcohol/week: 0.6 - 1.2 oz     Types: 1 - 2 Glasses of wine per week     Comment: 1-2 glasses of wine a week if that    Drug use: Never    Sexual activity: Yes     Partners: Female     Birth control/protection: Post-Menopausal   Other Topics Concern    Not on file   Social History Narrative    Not on file     Social Drivers of Health     Financial Resource Strain: Low Risk  (3/31/2025)    Overall Financial Resource Strain (CARDIA)     Difficulty of Paying Living Expenses: Not very hard   Food Insecurity: No Food Insecurity (3/31/2025)    Hunger Vital Sign     Worried About Running Out of Food in the Last Year: Never true     Ran Out of Food in the Last Year: Never true   Transportation Needs: No Transportation Needs (3/31/2025)    PRAPARE - Transportation     Lack of Transportation (Medical): No     Lack of Transportation (Non-Medical): No   Physical Activity: Inactive (3/31/2025)    Exercise Vital Sign     Days of Exercise per Week: 0 days     Minutes of Exercise per Session: 0 min   Stress: No Stress Concern Present (3/31/2025)    [employer] [place] [employer] - Occupational Stress Questionnaire     Feeling of Stress : Not at all   Social Connections: Moderately Isolated (3/31/2025)    Social Connection and Isolation Panel [NHANES]     Frequency of Communication with Friends and Family: Three times a week     Frequency of Social Gatherings with Friends and Family: Never     Attends  "Baptism Services: Never     Active Member of Clubs or Organizations: No     Attends Club or Organization Meetings: Never     Marital Status:    Intimate Partner Violence: Not on file   Housing Stability: Low Risk  (3/31/2025)    Housing Stability Vital Sign     Unable to Pay for Housing in the Last Year: No     Number of Times Moved in the Last Year: 0     Homeless in the Last Year: No       Patient Active Problem List    Diagnosis Date Noted    Leukopenia 04/02/2025    Macrocytosis without anemia 04/02/2025    Epistaxis 03/23/2022    Hyperlipidemia 03/23/2022    History of skin cancer 03/23/2022    Chronic constipation 03/23/2022    Peripheral edema 03/23/2022    BMI 28.0-28.9,adult 03/23/2022    History of varicose veins of lower extremity 03/23/2022    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      albuterol 108 (90 Base) MCG/ACT Aero Soln inhalation aerosol Inhale 2 Puffs every four hours as needed for Shortness of Breath. 8.5 g 1     No current facility-administered medications for this visit.     Allergies   Allergen Reactions    Phenazopyridine Rash    Phenazopyridine Hcl Rash    Azo-Gantrisin Rash     Review of Systems   Constitutional:  Negative for chills, fever, malaise/fatigue and weight loss.   Respiratory:  Negative for cough and shortness of breath.    Cardiovascular:  Negative for chest pain.   Gastrointestinal:  Negative for abdominal pain, blood in stool, diarrhea, nausea and vomiting.   Skin:  Negative for rash.        Objective:     /64 (BP Location: Right arm, Patient Position: Sitting, BP Cuff Size: Adult)   Pulse 68   Temp 36.2 °C (97.1 °F) (Temporal)   Resp 14   Ht 1.727 m (5' 8\")   Wt 84.2 kg (185 lb 9.6 oz)   SpO2 97%   BMI 28.22 kg/m² "   Body mass index is 28.22 kg/m².  Wt Readings from Last 4 Encounters:   04/02/25 84.2 kg (185 lb 9.6 oz)   01/30/25 81.6 kg (180 lb)   01/13/25 82.1 kg (181 lb 1.6 oz)   10/23/23 83 kg (183 lb)     Physical Exam  Vitals reviewed.   Constitutional:       General: She is not in acute distress.     Appearance: Normal appearance. She is not ill-appearing.   HENT:      Head: Normocephalic and atraumatic.      Right Ear: Tympanic membrane, ear canal and external ear normal.      Left Ear: Tympanic membrane, ear canal and external ear normal.      Nose: Nose normal.      Mouth/Throat:      Mouth: Mucous membranes are moist.      Pharynx: No oropharyngeal exudate or posterior oropharyngeal erythema.   Eyes:      General: No scleral icterus.     Conjunctiva/sclera: Conjunctivae normal.   Cardiovascular:      Rate and Rhythm: Normal rate and regular rhythm.      Heart sounds: Normal heart sounds. No murmur heard.  Pulmonary:      Effort: Pulmonary effort is normal. No respiratory distress.      Breath sounds: Normal breath sounds. No wheezing, rhonchi or rales.   Abdominal:      General: Abdomen is flat. Bowel sounds are normal. There is no distension.      Palpations: Abdomen is soft. There is no mass.      Tenderness: There is no abdominal tenderness. There is no guarding or rebound.   Musculoskeletal:      Cervical back: Normal range of motion and neck supple. No rigidity or tenderness.      Right lower leg: No edema.      Left lower leg: No edema.   Lymphadenopathy:      Cervical: No cervical adenopathy.   Skin:     General: Skin is warm and dry.      Findings: No rash.   Neurological:      General: No focal deficit present.      Mental Status: She is alert and oriented to person, place, and time.      Gait: Gait normal.   Psychiatric:         Mood and Affect: Mood normal.         Behavior: Behavior normal.         Thought Content: Thought content normal.       Labs: Reviewed from 5/10/2022, 3/1/2025    Assessment and  Plan:     1. Encounter for annual general medical examination without abnormal findings in adult  HCM: Reviewed with patient as above.  Immunizations discussed/recommended and patient directed to the pharmacy if vaccines not available in clinic, out of the pneumococcal vaccine today in clinic.  Age-appropriate anticipatory guidance discussed: sun screen, dental visits, healthy diet/exercise.    2. Hyperlipidemia, unspecified hyperlipidemia type  Chronic, not at threshold for statin. Discussed ideal ranges and ways to improve with lifestyle choices. Trend (labs already ordered).  The 10-year ASCVD risk score (Lashanda JARAMILLO, et al., 2019) is: 3.4%    3. Leukopenia, unspecified type  4. Macrocytosis without anemia  Mild, has labs to trend.    5. Chronic constipation  Chronic, improved with OTC tea. Has labs to complete including TSH and electrolytes.  Advised adequate hydration, fiber and exercise.    Follow-up: Return in about 1 year (around 4/2/2026), or if symptoms worsen or fail to improve, for Annual.

## 2025-04-02 NOTE — PATIENT INSTRUCTIONS
Vaccines due that can be received only at pharmacy:  COVID  RSV (respiratory syncytial virus) (fall/winter)    Pneumococcal 20 (due now but we were out)    fasting labs due    Aim for goal of 150 minutes a week of moderate intensity exercise (ex 30 minutes 5 times a week)    For bone health:  Vitamin D3 1000IU daily  Calcium 1200mg/day (diet best, 600mg twice a day)

## 2025-04-05 ENCOUNTER — HOSPITAL ENCOUNTER (OUTPATIENT)
Dept: LAB | Facility: MEDICAL CENTER | Age: 65
End: 2025-04-05
Attending: STUDENT IN AN ORGANIZED HEALTH CARE EDUCATION/TRAINING PROGRAM
Payer: COMMERCIAL

## 2025-04-05 DIAGNOSIS — E78.5 HYPERLIPIDEMIA, UNSPECIFIED HYPERLIPIDEMIA TYPE: ICD-10-CM

## 2025-04-05 DIAGNOSIS — D72.819 LEUKOPENIA, UNSPECIFIED TYPE: ICD-10-CM

## 2025-04-05 LAB
ALBUMIN SERPL BCP-MCNC: 4.4 G/DL (ref 3.2–4.9)
ALBUMIN/GLOB SERPL: 1.5 G/DL
ALP SERPL-CCNC: 83 U/L (ref 30–99)
ALT SERPL-CCNC: 20 U/L (ref 2–50)
ANION GAP SERPL CALC-SCNC: 13 MMOL/L (ref 7–16)
AST SERPL-CCNC: 27 U/L (ref 12–45)
BASOPHILS # BLD AUTO: 1.3 % (ref 0–1.8)
BASOPHILS # BLD: 0.05 K/UL (ref 0–0.12)
BILIRUB SERPL-MCNC: 0.6 MG/DL (ref 0.1–1.5)
BUN SERPL-MCNC: 15 MG/DL (ref 8–22)
CALCIUM ALBUM COR SERPL-MCNC: 9.5 MG/DL (ref 8.5–10.5)
CALCIUM SERPL-MCNC: 9.8 MG/DL (ref 8.5–10.5)
CHLORIDE SERPL-SCNC: 110 MMOL/L (ref 96–112)
CHOLEST SERPL-MCNC: 240 MG/DL (ref 100–199)
CO2 SERPL-SCNC: 23 MMOL/L (ref 20–33)
CREAT SERPL-MCNC: 0.85 MG/DL (ref 0.5–1.4)
EOSINOPHIL # BLD AUTO: 0.11 K/UL (ref 0–0.51)
EOSINOPHIL NFR BLD: 2.8 % (ref 0–6.9)
ERYTHROCYTE [DISTWIDTH] IN BLOOD BY AUTOMATED COUNT: 44 FL (ref 35.9–50)
FOLATE SERPL-MCNC: 28.5 NG/ML
GFR SERPLBLD CREATININE-BSD FMLA CKD-EPI: 77 ML/MIN/1.73 M 2
GLOBULIN SER CALC-MCNC: 2.9 G/DL (ref 1.9–3.5)
GLUCOSE SERPL-MCNC: 99 MG/DL (ref 65–99)
HCT VFR BLD AUTO: 45.4 % (ref 37–47)
HDLC SERPL-MCNC: 51 MG/DL
HGB BLD-MCNC: 14.9 G/DL (ref 12–16)
IMM GRANULOCYTES # BLD AUTO: 0.01 K/UL (ref 0–0.11)
IMM GRANULOCYTES NFR BLD AUTO: 0.3 % (ref 0–0.9)
LDLC SERPL CALC-MCNC: 165 MG/DL
LYMPHOCYTES # BLD AUTO: 1.14 K/UL (ref 1–4.8)
LYMPHOCYTES NFR BLD: 28.9 % (ref 22–41)
MCH RBC QN AUTO: 31.5 PG (ref 27–33)
MCHC RBC AUTO-ENTMCNC: 32.8 G/DL (ref 32.2–35.5)
MCV RBC AUTO: 96 FL (ref 81.4–97.8)
MONOCYTES # BLD AUTO: 0.36 K/UL (ref 0–0.85)
MONOCYTES NFR BLD AUTO: 9.1 % (ref 0–13.4)
NEUTROPHILS # BLD AUTO: 2.27 K/UL (ref 1.82–7.42)
NEUTROPHILS NFR BLD: 57.6 % (ref 44–72)
NRBC # BLD AUTO: 0 K/UL
NRBC BLD-RTO: 0 /100 WBC (ref 0–0.2)
PLATELET # BLD AUTO: 306 K/UL (ref 164–446)
PMV BLD AUTO: 10.7 FL (ref 9–12.9)
POTASSIUM SERPL-SCNC: 4.5 MMOL/L (ref 3.6–5.5)
PROT SERPL-MCNC: 7.3 G/DL (ref 6–8.2)
RBC # BLD AUTO: 4.73 M/UL (ref 4.2–5.4)
SODIUM SERPL-SCNC: 146 MMOL/L (ref 135–145)
TRIGL SERPL-MCNC: 118 MG/DL (ref 0–149)
TSH SERPL DL<=0.005 MIU/L-ACNC: 1.45 UIU/ML (ref 0.38–5.33)
VIT B12 SERPL-MCNC: 587 PG/ML (ref 211–911)
WBC # BLD AUTO: 3.9 K/UL (ref 4.8–10.8)

## 2025-04-05 PROCEDURE — 85025 COMPLETE CBC W/AUTO DIFF WBC: CPT

## 2025-04-05 PROCEDURE — 84443 ASSAY THYROID STIM HORMONE: CPT

## 2025-04-05 PROCEDURE — 82607 VITAMIN B-12: CPT

## 2025-04-05 PROCEDURE — 36415 COLL VENOUS BLD VENIPUNCTURE: CPT

## 2025-04-05 PROCEDURE — 80053 COMPREHEN METABOLIC PANEL: CPT

## 2025-04-05 PROCEDURE — 80061 LIPID PANEL: CPT

## 2025-04-05 PROCEDURE — 82746 ASSAY OF FOLIC ACID SERUM: CPT

## 2025-04-20 ENCOUNTER — RESULTS FOLLOW-UP (OUTPATIENT)
Dept: MEDICAL GROUP | Facility: LAB | Age: 65
End: 2025-04-20

## 2025-04-20 DIAGNOSIS — D72.819 LEUKOPENIA, UNSPECIFIED TYPE: ICD-10-CM

## 2025-04-20 DIAGNOSIS — E87.0 HYPERNATREMIA: ICD-10-CM

## 2025-07-17 ENCOUNTER — HOSPITAL ENCOUNTER (OUTPATIENT)
Dept: LAB | Facility: MEDICAL CENTER | Age: 65
End: 2025-07-17
Attending: STUDENT IN AN ORGANIZED HEALTH CARE EDUCATION/TRAINING PROGRAM
Payer: COMMERCIAL

## 2025-07-17 DIAGNOSIS — D72.819 LEUKOPENIA, UNSPECIFIED TYPE: ICD-10-CM

## 2025-07-17 DIAGNOSIS — E87.0 HYPERNATREMIA: ICD-10-CM

## 2025-07-17 LAB
ANION GAP SERPL CALC-SCNC: 12 MMOL/L (ref 7–16)
BASOPHILS # BLD AUTO: 0.8 % (ref 0–1.8)
BASOPHILS # BLD: 0.04 K/UL (ref 0–0.12)
BUN SERPL-MCNC: 11 MG/DL (ref 8–22)
CALCIUM SERPL-MCNC: 9.5 MG/DL (ref 8.5–10.5)
CHLORIDE SERPL-SCNC: 106 MMOL/L (ref 96–112)
CO2 SERPL-SCNC: 20 MMOL/L (ref 20–33)
CREAT SERPL-MCNC: 0.93 MG/DL (ref 0.5–1.4)
EOSINOPHIL # BLD AUTO: 0.11 K/UL (ref 0–0.51)
EOSINOPHIL NFR BLD: 2.3 % (ref 0–6.9)
ERYTHROCYTE [DISTWIDTH] IN BLOOD BY AUTOMATED COUNT: 43.2 FL (ref 35.9–50)
GFR SERPLBLD CREATININE-BSD FMLA CKD-EPI: 69 ML/MIN/1.73 M 2
GLUCOSE SERPL-MCNC: 85 MG/DL (ref 65–99)
HCT VFR BLD AUTO: 41.4 % (ref 37–47)
HGB BLD-MCNC: 14.2 G/DL (ref 12–16)
IMM GRANULOCYTES # BLD AUTO: 0 K/UL (ref 0–0.11)
IMM GRANULOCYTES NFR BLD AUTO: 0 % (ref 0–0.9)
LYMPHOCYTES # BLD AUTO: 1.6 K/UL (ref 1–4.8)
LYMPHOCYTES NFR BLD: 32.9 % (ref 22–41)
MCH RBC QN AUTO: 32.6 PG (ref 27–33)
MCHC RBC AUTO-ENTMCNC: 34.3 G/DL (ref 32.2–35.5)
MCV RBC AUTO: 95 FL (ref 81.4–97.8)
MONOCYTES # BLD AUTO: 0.61 K/UL (ref 0–0.85)
MONOCYTES NFR BLD AUTO: 12.6 % (ref 0–13.4)
NEUTROPHILS # BLD AUTO: 2.5 K/UL (ref 1.82–7.42)
NEUTROPHILS NFR BLD: 51.4 % (ref 44–72)
NRBC # BLD AUTO: 0 K/UL
NRBC BLD-RTO: 0 /100 WBC (ref 0–0.2)
PLATELET # BLD AUTO: 254 K/UL (ref 164–446)
PMV BLD AUTO: 11.1 FL (ref 9–12.9)
POTASSIUM SERPL-SCNC: 4.1 MMOL/L (ref 3.6–5.5)
RBC # BLD AUTO: 4.36 M/UL (ref 4.2–5.4)
SODIUM SERPL-SCNC: 138 MMOL/L (ref 135–145)
WBC # BLD AUTO: 4.9 K/UL (ref 4.8–10.8)

## 2025-07-17 PROCEDURE — 80048 BASIC METABOLIC PNL TOTAL CA: CPT

## 2025-07-17 PROCEDURE — 36415 COLL VENOUS BLD VENIPUNCTURE: CPT

## 2025-07-17 PROCEDURE — 85025 COMPLETE CBC W/AUTO DIFF WBC: CPT
